# Patient Record
Sex: MALE | Race: WHITE | NOT HISPANIC OR LATINO | URBAN - METROPOLITAN AREA
[De-identification: names, ages, dates, MRNs, and addresses within clinical notes are randomized per-mention and may not be internally consistent; named-entity substitution may affect disease eponyms.]

---

## 2017-05-12 ENCOUNTER — OUTPATIENT (OUTPATIENT)
Dept: OUTPATIENT SERVICES | Facility: HOSPITAL | Age: 72
LOS: 1 days | Discharge: HOME | End: 2017-05-12

## 2017-05-19 DIAGNOSIS — D69.6 THROMBOCYTOPENIA, UNSPECIFIED: ICD-10-CM

## 2017-05-19 DIAGNOSIS — Z86.11 PERSONAL HISTORY OF TUBERCULOSIS: ICD-10-CM

## 2017-05-19 DIAGNOSIS — F31.78 BIPOLAR DISORDER, IN FULL REMISSION, MOST RECENT EPISODE MIXED: ICD-10-CM

## 2017-05-19 DIAGNOSIS — H35.372 PUCKERING OF MACULA, LEFT EYE: ICD-10-CM

## 2017-05-19 DIAGNOSIS — Z88.2 ALLERGY STATUS TO SULFONAMIDES: ICD-10-CM

## 2017-05-19 DIAGNOSIS — I83.90 ASYMPTOMATIC VARICOSE VEINS OF UNSPECIFIED LOWER EXTREMITY: ICD-10-CM

## 2017-05-19 DIAGNOSIS — E78.00 PURE HYPERCHOLESTEROLEMIA, UNSPECIFIED: ICD-10-CM

## 2017-05-19 DIAGNOSIS — F41.9 ANXIETY DISORDER, UNSPECIFIED: ICD-10-CM

## 2020-01-27 PROBLEM — Z00.00 ENCOUNTER FOR PREVENTIVE HEALTH EXAMINATION: Status: ACTIVE | Noted: 2020-01-27

## 2020-01-30 ENCOUNTER — APPOINTMENT (OUTPATIENT)
Dept: GERIATRICS | Facility: CLINIC | Age: 75
End: 2020-01-30
Payer: MEDICARE

## 2020-01-30 VITALS
BODY MASS INDEX: 24.42 KG/M2 | SYSTOLIC BLOOD PRESSURE: 118 MMHG | DIASTOLIC BLOOD PRESSURE: 60 MMHG | RESPIRATION RATE: 16 BRPM | WEIGHT: 155.6 LBS | HEART RATE: 62 BPM | HEIGHT: 67 IN | OXYGEN SATURATION: 99 % | TEMPERATURE: 98.2 F

## 2020-01-30 PROCEDURE — 99204 OFFICE O/P NEW MOD 45 MIN: CPT

## 2020-01-30 NOTE — ASSESSMENT
[Regular activities] : regular physical, social and mental activities [Social Work Referral] : Social Work referral [Daily physical exercise] : daily physical exercise [Medications discussed: _____] : Medications discussed: [unfilled] [Remove loose items] : remove loose rugs or any other loose items that could lead to tripping and falling [Follow up with Opthtamologist] : follow up regularly with an ophthalmologist to optimize vision [Dietary calcium intake] : dietary calcium intake discussed [Vitamin D supplementation] : Vitamin D supplementation [Daily physical exercise as tolerated] : Daily physical exercise as tolerated [Normal Weight (BMI <25)] : normal weight - BMI <25 [Driving] : driving [Vaccines] : vaccines [Medication Management] : medication management [Lab Results] : lab results [Discussed at today's visit] : Advance Directives Discussed at today's visit [FreeTextEntry1] : 76 yo M with PMHx of HTN, HLD, PVD with Left groin DVT, treated with blood thinners (pt. stated possibly Eliquis),s/p prophylactic right leg superficial vein removal surgery, bipolar disorder (treated with 51 sessions of ECT,last one 2004, mild memory loss residual), enlarged prostate with biopsy(benign) in 2019,last PSA 12/2019 (Dr. Kerns -Urology), prediabetes hx.\par \par 1. HTN \par    a. Stable, continue with Carvedilol 3.125 mg BID\par \par 2. CAD\par    a. Continue with Simvastatin 20 mg at bedtime.\par \par 3. Bipolar depression\par   a. Stable, continue with Fluvoxamine 100 mg at bedtime. \par \par 4. DVT -  completed treatment\par    a.Pt. educated to continue wearing compression stockings daily. \par \par 5. Health maintenance\par    a. Shringrix vaccine recommended, pt. educated on the efficacy of vaccine and spacing. \par    b. Advised to stop multivitamin, encouraged to maintain a healthy diet, eating 5-7 servings of fruits of vegetables      daily. \par   c. Follow up visit in 4 months. \par   d. Available for urgent visits or as needed by phone. \par \par 6. Advance care planning\par   a. Health care proxy discussed in today's visit, form provided to pt. and spouse. \par Jaye Toussaint, RN, CCRN\par FNP [facility]\par

## 2020-01-30 NOTE — SOCIAL HISTORY
[Fully functional (bathing, dressing, toileting, transferring, walking, feeding)] : Fully functional (bathing, dressing, toileting, transferring, walking, feeding) [No falls in past year] : Patient reported no falls in the past year [Fully functional (using the telephone, shopping, preparing meals, housekeeping, doing laundry, using transportation,] : Fully functional and needs no help or supervision to perform IADLs (using the telephone, shopping, preparing meals, housekeeping, doing laundry, using transportation, managing medications and managing finances) [Driving] : driving [FreeTextEntry1] : Ingrid Rodriguez [de-identified] : none

## 2020-01-30 NOTE — PHYSICAL EXAM
[General Appearance - Alert] : alert [General Appearance - In No Acute Distress] : in no acute distress [General Appearance - Well Nourished] : well nourished [General Appearance - Well Developed] : well developed [General Appearance - Well-Appearing] : healthy appearing [Sclera] : the sclera and conjunctiva were normal [PERRL With Normal Accommodation] : pupils were equal in size, round, and reactive to light [No Oral Pallor] : no oral pallor [No Oral Cyanosis] : no oral cyanosis [Normal Oral Mucosa] : normal oral mucosa [Outer Ear] : the ears and nose were normal in appearance [Hearing Threshold Finger Rub Not [place]] : hearing was normal [Neck Appearance] : the appearance of the neck was normal [Oropharynx] : The oropharynx was normal [Examination Of The Oral Cavity] : the lips and gums were normal [Jugular Venous Distention Increased] : there was no jugular-venous distention [Thyroid Diffuse Enlargement] : the thyroid was not enlarged [Neck Cervical Mass (___cm)] : no neck mass was observed [Exaggerated Use Of Accessory Muscles For Inspiration] : no accessory muscle use [Auscultation Breath Sounds / Voice Sounds] : lungs were clear to auscultation bilaterally [Respiration, Rhythm And Depth] : normal respiratory rhythm and effort [Chest Palpation] : palpation of the chest revealed no abnormalities [Apical Impulse] : the apical impulse was normal [Heart Sounds] : normal S1 and S2 [Heart Rate And Rhythm] : heart rate was normal and rhythm regular [Murmurs] : no murmurs [Heart Sounds Gallop] : no gallops [Edema] : there was no peripheral edema [Bowel Sounds] : normal bowel sounds [Abdomen Soft] : soft [Abdomen Tenderness] : non-tender [Abdomen Mass (___ Cm)] : no abdominal mass palpated [Cervical Lymph Nodes Enlarged Anterior Bilaterally] : anterior cervical [Supraclavicular Lymph Nodes Enlarged Bilaterally] : supraclavicular [Cervical Lymph Nodes Enlarged Posterior Bilaterally] : posterior cervical [No CVA Tenderness] : no ~M costovertebral angle tenderness [Axillary Lymph Nodes Enlarged Bilaterally] : axillary [Abnormal Walk] : normal gait [Skin Color & Pigmentation] : normal skin color and pigmentation [Musculoskeletal - Swelling] : no joint swelling seen [Skin Turgor] : normal skin turgor [] : no rash [Skin Lesions] : no skin lesions [Motor Exam] : the motor exam was normal [No Focal Deficits] : no focal deficits [Oriented To Time, Place, And Person] : oriented to person, place, and time [Impaired Insight] : insight and judgment were intact [Affect] : the affect was normal [Mood] : the mood was normal

## 2020-01-30 NOTE — REVIEW OF SYSTEMS
[Nocturia] : nocturia [Negative] : Endocrine [Chills] : no chills [Fever] : no fever [Feeling Poorly] : not feeling poorly [Feeling Tired] : not feeling tired [Recent Weight Gain (___ Lbs)] : no recent weight gain [Recent Weight Loss (___ Lbs)] : no recent weight loss [Eye Pain] : no eye pain [Red Eyes] : eyes not red [Dry Eyes] : no dryness of the eyes [Discharge From Eyes] : no purulent discharge from the eyes [Chest Pain] : no chest pain [Heart Rate Is Fast] : the heart rate was not fast [Palpitations] : no palpitations [Lower Ext Edema] : no extremity edema [Leg Claudication] : no intermittent leg claudication [Wheezing] : no wheezing [Shortness Of Breath] : no shortness of breath [SOB on Exertion] : no shortness of breath during exertion [Cough] : no cough [Constipation] : no constipation [Abdominal Pain] : no abdominal pain [Vomiting] : no vomiting [Diarrhea] : no diarrhea [Dysuria] : no dysuria [Incontinence] : no incontinence [Convulsions] : no convulsions [Confused] : no confusion [Dizziness] : no dizziness [Fainting] : no fainting [Suicidal] : not suicidal [Limb Weakness] : no limb weakness [Difficulty Walking] : no difficulty walking [Sleep Disturbances] : no sleep disturbances [Depression] : no depression [Anxiety] : no anxiety [FreeTextEntry3] : wear eye glasses. [FreeTextEntry8] : pt. stated he wakes up once during the night.

## 2020-01-30 NOTE — HISTORY OF PRESENT ILLNESS
[0] : 1) Little interest or pleasure doing things: Not at all [FreeTextEntry1] : 76 yo M with PMHx of HTN, HLD, PVD with Left groin DVT, treated with blood thinners (pt. stated possibly Eliquis),s/p prophylactic right leg superficial vein removal surgery, bipolar disorder (treated with 51 sessions of ECT,last one 2004, mild memory loss residual), enlarged prostate with biopsy(benign) in 2019,last PSA 12/2019 (Dr. Kerns -Urology),  prediabetes hx.\par \par Echo (5/2019): mild valvular disease\par Nuclear stress test (9/2019): rest EF 73%, exercise EF 66%. ECG stress positive for ischemia. Normal LV size and function,normal wall function.\par \par Pt. is a retired from the [employer]. He lives with his spouse Ingrid. Pt. drives, able to complete all ADLs and IADLs. Ingrid is partially blind. They participate in theater and volunteer activities. \par \par Today, pt. denies pain, denies chest pain,denies SOB, denies dizziness,denies malaise. Pt. reports eating a well balanced diet, he avoids added sugar. pt. reports normal daily bowel movements and, normal voiding daily. Pt. reports waking up once to void at night, he stated he is content with that. Pt. reports feeling well.  \par \par father with heart disease\par

## 2020-01-30 NOTE — CURRENT MEDS
[Reports Changes In Medication (including OTC)] : Patient reports changes in medication [Provider aware of all medications taken (including OTC)] : Patient stated provider is aware of all medications ~he/she~ is taking including OTC  [Medication Reconciliation Completed] : Medication reconciliation completed [ at pharmacy] :  at local pharmacy [] : does not miss any medication doses

## 2020-03-26 RX ORDER — PERPHENAZINE 2 MG
TABLET ORAL
Qty: 30 | Refills: 6 | Status: ACTIVE | COMMUNITY
Start: 2020-03-26

## 2020-03-26 RX ORDER — LUTEIN 6 MG
6 TABLET ORAL
Refills: 0 | Status: ACTIVE | COMMUNITY
Start: 2020-03-26

## 2020-03-26 RX ORDER — ASPIRIN ENTERIC COATED TABLETS 81 MG 81 MG/1
81 TABLET, DELAYED RELEASE ORAL
Qty: 30 | Refills: 6 | Status: ACTIVE | COMMUNITY
Start: 2020-03-26

## 2020-03-26 RX ORDER — CHOLECALCIFEROL (VITAMIN D3) 50 MCG
50 MCG TABLET ORAL
Refills: 0 | Status: ACTIVE | COMMUNITY
Start: 2020-03-26

## 2020-03-26 RX ORDER — GLUCOSAMINE HCL 500 MG
100 TABLET ORAL
Refills: 0 | Status: ACTIVE | COMMUNITY
Start: 2020-03-26

## 2020-03-26 RX ORDER — ELECTROLYTES/DEXTROSE
SOLUTION, ORAL ORAL DAILY
Refills: 0 | Status: ACTIVE | COMMUNITY
Start: 2020-03-26

## 2020-03-26 RX ORDER — FLUVOXAMINE MALEATE 100 MG/1
100 TABLET, FILM COATED ORAL
Refills: 0 | Status: ACTIVE | COMMUNITY
Start: 2020-03-26

## 2020-03-26 RX ORDER — PHENOL 1.4 %
250 AEROSOL, SPRAY (ML) MUCOUS MEMBRANE
Refills: 0 | Status: ACTIVE | COMMUNITY
Start: 2020-03-26

## 2020-05-13 ENCOUNTER — APPOINTMENT (OUTPATIENT)
Dept: GERIATRICS | Facility: CLINIC | Age: 75
End: 2020-05-13
Payer: MEDICARE

## 2020-05-13 PROCEDURE — 99213 OFFICE O/P EST LOW 20 MIN: CPT | Mod: 95

## 2020-05-13 NOTE — ASSESSMENT
[FreeTextEntry1] : 1. HTN \par  a. continue with Carvedilol 3.125 mg BID\par \par 2. CAD\par  a. Continue with Simvastatin 20 mg at bedtime.\par \par 3. Bipolar depression\par  a. Stable, continue with Fluvoxamine 100 mg 2 tabs at bedtime. \par \par recommended to incorporate daily physical activity \par plan to repeat bloodwork in fall/winter

## 2020-05-13 NOTE — HISTORY OF PRESENT ILLNESS
[Home] : at home, [unfilled] , at the time of the visit. [Medical Office: ([facility])___] : at the medical office located in  [Patient] : the patient [Self] : self [FreeTextEntry1] : 74 yo M with PMHx of HTN, HLD, PVD with Left groin DVT, completed tx with Eliquis),s/p prophylactic right leg superficial vein removal surgery, bipolar disorder (treated with 51 sessions of ECT,last one 2004, mild memory loss residual), enlarged prostate with biopsy(benign) in 2019,last PSA 12/2019 (Dr. Kerns -Urology), prediabetes hx.\par \par Echo (5/2019): mild valvular disease\par Nuclear stress test (9/2019): rest EF 73%, exercise EF 66%. ECG stress positive for ischemia. Normal LV size and function,normal wall function.\par \par Pt. is a retired from the [employer]. He lives with his spouse who is partially blind. Pt. drives, able to complete all ADLs and IADLs.   \par \par denies chest pain,SOB, /lightheadedness/dizziness, coughing or fever. He reports feeling well. \par notes loose BMs but denies diarrhea.  reports no longer taking metamucil.\par remains on same medications, states his mood is good.\par \par nocturia x2 during the night.  otherwise denies LUTS.\par

## 2020-05-13 NOTE — PHYSICAL EXAM
[General Appearance - Alert] : alert [General Appearance - In No Acute Distress] : in no acute distress [Sclera] : the sclera and conjunctiva were normal [Extraocular Movements] : extraocular movements were intact [] : no respiratory distress [Respiration, Rhythm And Depth] : normal respiratory rhythm and effort [Exaggerated Use Of Accessory Muscles For Inspiration] : no accessory muscle use [Skin Color & Pigmentation] : normal skin color and pigmentation [Affect] : the affect was normal [Mood] : the mood was normal

## 2020-09-25 ENCOUNTER — APPOINTMENT (OUTPATIENT)
Dept: GERIATRICS | Facility: CLINIC | Age: 75
End: 2020-09-25
Payer: MEDICARE

## 2020-09-25 VITALS
WEIGHT: 150.4 LBS | DIASTOLIC BLOOD PRESSURE: 62 MMHG | OXYGEN SATURATION: 97 % | HEART RATE: 69 BPM | HEIGHT: 67 IN | SYSTOLIC BLOOD PRESSURE: 110 MMHG | RESPIRATION RATE: 16 BRPM | TEMPERATURE: 98 F | BODY MASS INDEX: 23.61 KG/M2

## 2020-09-25 PROCEDURE — 99214 OFFICE O/P EST MOD 30 MIN: CPT

## 2020-09-25 NOTE — SOCIAL HISTORY
[Fully functional (bathing, dressing, toileting, transferring, walking, feeding)] : Fully functional (bathing, dressing, toileting, transferring, walking, feeding) [Fully functional (using the telephone, shopping, preparing meals, housekeeping, doing laundry, using transportation,] : Fully functional and needs no help or supervision to perform IADLs (using the telephone, shopping, preparing meals, housekeeping, doing laundry, using transportation, managing medications and managing finances)

## 2020-09-25 NOTE — HISTORY OF PRESENT ILLNESS
[FreeTextEntry1] : 76 yo M with PMHx of HTN, HLD, Carotid artery stenosis, mild valvular disease, PVD, hx of DVT, bipolar disorder, prediabetes, and bph seen for follow up visit.\par \par today complains of right hand contracture of 4th and 5th digits:  started ~15 years ago s/p injection.  denies pain or limitation in his daily activities.  \par \par Echo (5/2019): mild valvular disease\par Nuclear stress test (9/2019): rest EF 73%, exercise EF 66%.  Normal LV size and function,normal wall function.\par \par Pt. is a retired from the [employer]. He lives with his spouse who is partially blind. \par Pt. drives, able to complete all ADLs and IADLs. \par \par bph: nocturia 2x, follows with urology, but missed last appt due to COVID.\par \par

## 2020-09-25 NOTE — ASSESSMENT
[Designated Healthcare Proxy] : Designated healthcare proxy [Name: ___] : Health Care Proxy's Name: [unfilled]  [FreeTextEntry1] : f/u in 5 months

## 2020-09-25 NOTE — PHYSICAL EXAM
[General Appearance - Alert] : alert [General Appearance - In No Acute Distress] : in no acute distress [Sclera] : the sclera and conjunctiva were normal [Extraocular Movements] : extraocular movements were intact [Outer Ear] : the ears and nose were normal in appearance [Neck Appearance] : the appearance of the neck was normal [] : no respiratory distress [Respiration, Rhythm And Depth] : normal respiratory rhythm and effort [Exaggerated Use Of Accessory Muscles For Inspiration] : no accessory muscle use [Auscultation Breath Sounds / Voice Sounds] : lungs were clear to auscultation bilaterally [Apical Impulse] : the apical impulse was normal [Heart Rate And Rhythm] : heart rate was normal and rhythm regular [Heart Sounds] : normal S1 and S2 [Murmurs] : no murmurs [Edema] : there was no peripheral edema [Bowel Sounds] : normal bowel sounds [Abdomen Soft] : soft [Abdomen Tenderness] : non-tender [Nail Clubbing] : no clubbing  or cyanosis of the fingernails [Involuntary Movements] : no involuntary movements were seen [Skin Color & Pigmentation] : normal skin color and pigmentation [No Focal Deficits] : no focal deficits [Affect] : the affect was normal [Mood] : the mood was normal [FreeTextEntry1] : hearing aides

## 2020-09-29 ENCOUNTER — LABORATORY RESULT (OUTPATIENT)
Age: 75
End: 2020-09-29

## 2020-09-30 LAB
25(OH)D3 SERPL-MCNC: 47 NG/ML
ALBUMIN SERPL ELPH-MCNC: 4.5 G/DL
ALP BLD-CCNC: 61 U/L
ALT SERPL-CCNC: 21 U/L
ANION GAP SERPL CALC-SCNC: 12 MMOL/L
APPEARANCE: CLEAR
AST SERPL-CCNC: 23 U/L
BASOPHILS # BLD AUTO: 0.07 K/UL
BASOPHILS NFR BLD AUTO: 1.4 %
BILIRUB SERPL-MCNC: 0.7 MG/DL
BILIRUBIN URINE: NEGATIVE
BLOOD URINE: NORMAL
BUN SERPL-MCNC: 25 MG/DL
CALCIUM SERPL-MCNC: 9.5 MG/DL
CHLORIDE SERPL-SCNC: 101 MMOL/L
CHOLEST SERPL-MCNC: 136 MG/DL
CHOLEST/HDLC SERPL: 2.7 RATIO
CO2 SERPL-SCNC: 27 MMOL/L
COLOR: YELLOW
CREAT SERPL-MCNC: 1.19 MG/DL
EOSINOPHIL # BLD AUTO: 0.41 K/UL
EOSINOPHIL NFR BLD AUTO: 8.4 %
ESTIMATED AVERAGE GLUCOSE: 120 MG/DL
GLUCOSE QUALITATIVE U: NEGATIVE
GLUCOSE SERPL-MCNC: 103 MG/DL
HBA1C MFR BLD HPLC: 5.8 %
HCT VFR BLD CALC: 46.6 %
HDLC SERPL-MCNC: 49 MG/DL
HGB BLD-MCNC: 15.1 G/DL
IMM GRANULOCYTES NFR BLD AUTO: 0.2 %
KETONES URINE: NEGATIVE
LDLC SERPL CALC-MCNC: 71 MG/DL
LEUKOCYTE ESTERASE URINE: NEGATIVE
LYMPHOCYTES # BLD AUTO: 1.37 K/UL
LYMPHOCYTES NFR BLD AUTO: 28.1 %
MAN DIFF?: NORMAL
MCHC RBC-ENTMCNC: 32.4 GM/DL
MCHC RBC-ENTMCNC: 32.8 PG
MCV RBC AUTO: 101.1 FL
MONOCYTES # BLD AUTO: 0.43 K/UL
MONOCYTES NFR BLD AUTO: 8.8 %
NEUTROPHILS # BLD AUTO: 2.58 K/UL
NEUTROPHILS NFR BLD AUTO: 53.1 %
NITRITE URINE: NEGATIVE
PH URINE: 6
PLATELET # BLD AUTO: 126 K/UL
POTASSIUM SERPL-SCNC: 4.2 MMOL/L
PROT SERPL-MCNC: 6.5 G/DL
PROTEIN URINE: ABNORMAL
PSA SERPL-MCNC: 6.15 NG/ML
RBC # BLD: 4.61 M/UL
RBC # FLD: 12.2 %
SODIUM SERPL-SCNC: 141 MMOL/L
SPECIFIC GRAVITY URINE: 1.03
TRIGL SERPL-MCNC: 75 MG/DL
TSH SERPL-ACNC: 5.6 UIU/ML
UROBILINOGEN URINE: ABNORMAL
WBC # FLD AUTO: 4.87 K/UL

## 2020-12-22 ENCOUNTER — NON-APPOINTMENT (OUTPATIENT)
Age: 75
End: 2020-12-22

## 2020-12-28 ENCOUNTER — APPOINTMENT (OUTPATIENT)
Dept: GERIATRICS | Facility: CLINIC | Age: 75
End: 2020-12-28
Payer: MEDICARE

## 2020-12-28 VITALS
SYSTOLIC BLOOD PRESSURE: 118 MMHG | DIASTOLIC BLOOD PRESSURE: 70 MMHG | WEIGHT: 149.5 LBS | HEART RATE: 69 BPM | TEMPERATURE: 98 F | OXYGEN SATURATION: 98 % | HEIGHT: 67 IN | BODY MASS INDEX: 23.46 KG/M2 | RESPIRATION RATE: 16 BRPM

## 2020-12-28 PROCEDURE — 99214 OFFICE O/P EST MOD 30 MIN: CPT

## 2020-12-28 NOTE — PHYSICAL EXAM
[General Appearance - Alert] : alert [General Appearance - In No Acute Distress] : in no acute distress [Sclera] : the sclera and conjunctiva were normal [Extraocular Movements] : extraocular movements were intact [FreeTextEntry1] : hearing aides [Neck Appearance] : the appearance of the neck was normal [Jugular Venous Distention Increased] : there was no jugular-venous distention [] : no respiratory distress [Respiration, Rhythm And Depth] : normal respiratory rhythm and effort [Exaggerated Use Of Accessory Muscles For Inspiration] : no accessory muscle use [Auscultation Breath Sounds / Voice Sounds] : lungs were clear to auscultation bilaterally [Apical Impulse] : the apical impulse was normal [Heart Rate And Rhythm] : heart rate was normal and rhythm regular [Heart Sounds] : normal S1 and S2 [Heart Sounds Gallop] : no gallops [Murmurs] : no murmurs [Heart Sounds Pericardial Friction Rub] : no pericardial rub [Edema] : there was no peripheral edema [Abdomen Soft] : soft [Abdomen Tenderness] : non-tender [Abnormal Walk] : normal gait [Musculoskeletal - Swelling] : no joint swelling seen [Motor Tone] : muscle strength and tone were normal [Impaired Insight] : insight and judgment were intact [Affect] : the affect was normal [Mood] : the mood was normal

## 2020-12-28 NOTE — HISTORY OF PRESENT ILLNESS
[FreeTextEntry1] : 76 yo M with PMHx of HTN, HLD, Carotid artery stenosis, mild valvular disease, PVD, hx of DVT, bipolar disorder, prediabetes, and bph who presents for follow up after episode of fall last week: described as while walking into bedroom last Monday evening , fell onto the floor, without tripping, without dizziness or LOC.\par Ingrid helped him up.  Earlier that day they had a very active day.  lots of walking, shopping.\par Next morning he had some left ankle tenderness, but mild.  He went about his normal day activities.\par also notices: increase nasal congestion lately. but no dizziness\par denies any recent illness:no palpitations, sob, or chest pain.  no gi complaints, no respiratory complaints. denies weakness, speech changes or vision changes.  denies dizziness.\par no alcohol use.  only drinks 2 glasses a day.  Hx of feeling lightheaded with standing.  wears compression stockings daily and is careful upon standing.\par planning to see cardiology in spring (q6months).  Last carotid dopplers ~2017.\par \par denies any recurrence of episode. \par \par Saw Urology 11/2020 with labwork: \par PSA 6.1 stable from previous\par \par \par Echo (5/2019): mild valvular disease\par Nuclear stress test (9/2019): rest EF 73%, exercise EF 66%. Normal LV size and function,normal wall function.\par \par Pt. is a retired from the [employer]. He lives with his spouse who is partially blind. \par Pt. drives, able to complete all ADLs and IADLs. \par \par

## 2021-01-29 ENCOUNTER — TRANSCRIPTION ENCOUNTER (OUTPATIENT)
Age: 76
End: 2021-01-29

## 2021-03-22 ENCOUNTER — TRANSCRIPTION ENCOUNTER (OUTPATIENT)
Age: 76
End: 2021-03-22

## 2021-03-29 ENCOUNTER — APPOINTMENT (OUTPATIENT)
Dept: GERIATRICS | Facility: CLINIC | Age: 76
End: 2021-03-29
Payer: MEDICARE

## 2021-03-29 VITALS
WEIGHT: 147.8 LBS | SYSTOLIC BLOOD PRESSURE: 130 MMHG | OXYGEN SATURATION: 98 % | DIASTOLIC BLOOD PRESSURE: 60 MMHG | BODY MASS INDEX: 22.14 KG/M2 | RESPIRATION RATE: 18 BRPM | TEMPERATURE: 98.4 F | HEART RATE: 65 BPM | HEIGHT: 68.4 IN

## 2021-03-29 DIAGNOSIS — M24.541 CONTRACTURE, RIGHT HAND: ICD-10-CM

## 2021-03-29 PROCEDURE — 99214 OFFICE O/P EST MOD 30 MIN: CPT

## 2021-03-29 NOTE — REVIEW OF SYSTEMS
[Loss Of Hearing] : hearing loss [As Noted in HPI] : as noted in HPI [Negative] : Neurological [Fever] : no fever [Chest Pain] : no chest pain [Palpitations] : no palpitations [Cough] : no cough [Sleep Disturbances] : no sleep disturbances

## 2021-03-29 NOTE — HISTORY OF PRESENT ILLNESS
[FreeTextEntry1] : 75yo M with PMHx of HTN, HLD, Carotid artery stenosis, mild valvular disease, PVD, hx of DVT, bipolar disorder, prediabetes, and bph seen for follow up visit.\par \par episode of collapse in Dec.  has work up with cardiology that was essentially unrevealing.\par \par labwork from cardiology reviewed:\par Vit D, TSH, normal\par ldl 53 and tchol 132 \par Cr 0.96 with overall normal cmp\par normal cbc\par \par prediabetes: 5.9%  diet. \par \par cardiac work up with cards in jan 2021 essentially normal with echo, ekg, carotid dopplers, stress test, and holter monitor. 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  denies pain or limitation in his daily activities.  \par \par Echo (5/2019): mild valvular disease'  1/2021: mild valvular disease\par Nuclear stress test (9/2019): rest EF 73%, exercise EF 66%.  Normal LV size and function,normal wall function.\par Nuclear stress 1/2021: normal\par \par bph: nocturia 2x, follows with urology has appt next month\par +incomplete emptying.  denies reduced stream or dysuria, or hematuria.\par hx of hernia repair (left)\par occasionally with have right sided groin pain, but nothing consistent, and unclear to express when occurs.\par \par Pt. is a retired from the [employer]. He lives with his spouse who is partially blind. \par Pt. drives, able to complete all ADLs and IADLs.

## 2021-05-18 ENCOUNTER — TRANSCRIPTION ENCOUNTER (OUTPATIENT)
Age: 76
End: 2021-05-18

## 2021-05-19 ENCOUNTER — TRANSCRIPTION ENCOUNTER (OUTPATIENT)
Age: 76
End: 2021-05-19

## 2021-08-16 ENCOUNTER — APPOINTMENT (OUTPATIENT)
Dept: GERIATRICS | Facility: CLINIC | Age: 76
End: 2021-08-16

## 2021-08-20 ENCOUNTER — APPOINTMENT (OUTPATIENT)
Dept: GERIATRICS | Facility: CLINIC | Age: 76
End: 2021-08-20
Payer: MEDICARE

## 2021-08-20 VITALS
HEIGHT: 68 IN | HEART RATE: 65 BPM | TEMPERATURE: 97.6 F | BODY MASS INDEX: 21.69 KG/M2 | OXYGEN SATURATION: 97 % | RESPIRATION RATE: 16 BRPM | WEIGHT: 143.13 LBS | SYSTOLIC BLOOD PRESSURE: 100 MMHG | DIASTOLIC BLOOD PRESSURE: 52 MMHG

## 2021-08-20 DIAGNOSIS — Z13.820 ENCOUNTER FOR SCREENING FOR OSTEOPOROSIS: ICD-10-CM

## 2021-08-20 PROCEDURE — 99214 OFFICE O/P EST MOD 30 MIN: CPT

## 2021-08-20 NOTE — ASSESSMENT
[Designated Healthcare Proxy] : Designated healthcare proxy [Name: ___] : Health Care Proxy's Name: [unfilled]  [FreeTextEntry1] : f/u 6 months: f/u dexa at next week\par \par mild weight loss:  doesn't eat lunch: advised to increase meals and discuss protein intake\par

## 2021-08-20 NOTE — PHYSICAL EXAM
[General Appearance - Alert] : alert [General Appearance - In No Acute Distress] : in no acute distress [Extraocular Movements] : extraocular movements were intact [Sclera] : the sclera and conjunctiva were normal [Outer Ear] : the ears and nose were normal in appearance [Neck Appearance] : the appearance of the neck was normal [] : no respiratory distress [Respiration, Rhythm And Depth] : normal respiratory rhythm and effort [Exaggerated Use Of Accessory Muscles For Inspiration] : no accessory muscle use [Auscultation Breath Sounds / Voice Sounds] : lungs were clear to auscultation bilaterally [Apical Impulse] : the apical impulse was normal [Heart Rate And Rhythm] : heart rate was normal and rhythm regular [Heart Sounds] : normal S1 and S2 [Murmurs] : no murmurs [Edema] : there was no peripheral edema [Bowel Sounds] : normal bowel sounds [Abdomen Soft] : soft [Abdomen Tenderness] : non-tender [Nail Clubbing] : no clubbing  or cyanosis of the fingernails [Involuntary Movements] : no involuntary movements were seen [Skin Color & Pigmentation] : normal skin color and pigmentation [No Focal Deficits] : no focal deficits [Affect] : the affect was normal [Mood] : the mood was normal [FreeTextEntry1] : hearing aides

## 2021-08-20 NOTE — HISTORY OF PRESENT ILLNESS
[FreeTextEntry1] : 75yo M with PMHx of HTN, HLD, Carotid artery stenosis, mild valvular disease, PVD, hx of DVT, bipolar disorder, prediabetes, and bph seen for follow up visit.\par \par notes he is a side sleeper and will develop periodic pain on shoulder in middle of night, that does not inhibit his sleep and is self resolving.  denies radicular pain or associated neck pain.\par \par lower bp today, but asymptomatic\par \par had dental implants, had one implant that fell out, but otherwise without dental complaints\par \par also reports symptoms c/w hx of folliculitis of right cheek \par \par HTN: lower bp noted today but asymptomatic\par sinus arrhythmia with PACs on previous holter\par episode of collapse in Dec 2020.  has work up with cardiology that was essentially unrevealing.\par Echo (5/2019): mild valvular disease'  1/2021: mild valvular disease\par Nuclear stress test (9/2019): rest EF 73%, exercise EF 66%.  Normal LV size and function,normal wall function.\par Nuclear stress 1/2021: normal\par cardiac work up with cards in jan 2021 essentially normal with echo, ekg, carotid dopplers, stress test, and holter monitor. (sinus arrhythmia with PACs)\par denies chest pain sob, or palpitations\par \par Carotid artery stenosis:\par \par prediabetes: \par \par hx of varicose vein surgery\par DVT in left leg in past \par wears thigh high compression stockings since\par \par \par bph: nocturia 2x, follows with urology DR. Nimesh Kerns\par last PSA improved.  PSA 5/2021 4.83\par denies reduced stream or dysuria, or hematuria.\par hx of hernia repair (left)\par \par  hx of likely Dupuytren's contracture right hand; s/p injection >15 years ago. denies pain or limitation in his daily activities. \par \par scoliosis\par \par \par Pt. is a retired from the [employer]. He lives with his spouse who is partially blind. \par Pt. drives, able to complete all ADLs and IADLs.

## 2021-09-03 ENCOUNTER — LABORATORY RESULT (OUTPATIENT)
Age: 76
End: 2021-09-03

## 2021-09-08 ENCOUNTER — TRANSCRIPTION ENCOUNTER (OUTPATIENT)
Age: 76
End: 2021-09-08

## 2021-09-08 LAB
25(OH)D3 SERPL-MCNC: 51.8 NG/ML
ALBUMIN SERPL ELPH-MCNC: 4.4 G/DL
ALP BLD-CCNC: 61 U/L
ALT SERPL-CCNC: 19 U/L
ANION GAP SERPL CALC-SCNC: 12 MMOL/L
APPEARANCE: CLEAR
AST SERPL-CCNC: 20 U/L
BASOPHILS # BLD AUTO: 0.07 K/UL
BASOPHILS NFR BLD AUTO: 1.4 %
BILIRUB SERPL-MCNC: 0.5 MG/DL
BILIRUBIN URINE: NEGATIVE
BLOOD URINE: NEGATIVE
BUN SERPL-MCNC: 24 MG/DL
CALCIUM SERPL-MCNC: 9.1 MG/DL
CHLORIDE SERPL-SCNC: 102 MMOL/L
CHOLEST SERPL-MCNC: 134 MG/DL
CO2 SERPL-SCNC: 27 MMOL/L
COLOR: YELLOW
CREAT SERPL-MCNC: 1.06 MG/DL
EOSINOPHIL # BLD AUTO: 0.35 K/UL
EOSINOPHIL NFR BLD AUTO: 7.1 %
ESTIMATED AVERAGE GLUCOSE: 123 MG/DL
GLUCOSE QUALITATIVE U: NEGATIVE
GLUCOSE SERPL-MCNC: 112 MG/DL
HBA1C MFR BLD HPLC: 5.9 %
HCT VFR BLD CALC: 45.7 %
HDLC SERPL-MCNC: 57 MG/DL
HGB BLD-MCNC: 14.7 G/DL
IMM GRANULOCYTES NFR BLD AUTO: 0.2 %
KETONES URINE: NEGATIVE
LDLC SERPL CALC-MCNC: 67 MG/DL
LEUKOCYTE ESTERASE URINE: NEGATIVE
LYMPHOCYTES # BLD AUTO: 1.47 K/UL
LYMPHOCYTES NFR BLD AUTO: 29.9 %
MAN DIFF?: NORMAL
MCHC RBC-ENTMCNC: 32.2 GM/DL
MCHC RBC-ENTMCNC: 32.7 PG
MCV RBC AUTO: 101.6 FL
MONOCYTES # BLD AUTO: 0.45 K/UL
MONOCYTES NFR BLD AUTO: 9.2 %
NEUTROPHILS # BLD AUTO: 2.56 K/UL
NEUTROPHILS NFR BLD AUTO: 52.2 %
NITRITE URINE: NEGATIVE
NONHDLC SERPL-MCNC: 77 MG/DL
PH URINE: 6.5
PLATELET # BLD AUTO: 135 K/UL
POTASSIUM SERPL-SCNC: 4.6 MMOL/L
PROT SERPL-MCNC: 6.6 G/DL
PROTEIN URINE: NORMAL
RBC # BLD: 4.5 M/UL
RBC # FLD: 12.4 %
SODIUM SERPL-SCNC: 141 MMOL/L
SPECIFIC GRAVITY URINE: 1.02
TRIGL SERPL-MCNC: 50 MG/DL
TSH SERPL-ACNC: 4.82 UIU/ML
UROBILINOGEN URINE: ABNORMAL
WBC # FLD AUTO: 4.91 K/UL

## 2021-09-10 ENCOUNTER — TRANSCRIPTION ENCOUNTER (OUTPATIENT)
Age: 76
End: 2021-09-10

## 2021-09-16 ENCOUNTER — TRANSCRIPTION ENCOUNTER (OUTPATIENT)
Age: 76
End: 2021-09-16

## 2021-10-28 ENCOUNTER — TRANSCRIPTION ENCOUNTER (OUTPATIENT)
Age: 76
End: 2021-10-28

## 2021-10-29 ENCOUNTER — TRANSCRIPTION ENCOUNTER (OUTPATIENT)
Age: 76
End: 2021-10-29

## 2021-11-17 ENCOUNTER — NON-APPOINTMENT (OUTPATIENT)
Age: 76
End: 2021-11-17

## 2021-11-17 DIAGNOSIS — Z20.7 CONTACT WITH AND (SUSPECTED) EXPOSURE TO PEDICULOSIS, ACARIASIS AND OTHER INFESTATIONS: ICD-10-CM

## 2021-11-18 ENCOUNTER — NON-APPOINTMENT (OUTPATIENT)
Age: 76
End: 2021-11-18

## 2021-11-18 ENCOUNTER — APPOINTMENT (OUTPATIENT)
Dept: GERIATRICS | Facility: CLINIC | Age: 76
End: 2021-11-18
Payer: MEDICARE

## 2021-11-18 ENCOUNTER — OUTPATIENT (OUTPATIENT)
Dept: OUTPATIENT SERVICES | Facility: HOSPITAL | Age: 76
LOS: 1 days | End: 2021-11-18
Payer: MEDICARE

## 2021-11-18 ENCOUNTER — APPOINTMENT (OUTPATIENT)
Dept: RADIOLOGY | Facility: IMAGING CENTER | Age: 76
End: 2021-11-18
Payer: MEDICARE

## 2021-11-18 VITALS
HEART RATE: 85 BPM | SYSTOLIC BLOOD PRESSURE: 130 MMHG | TEMPERATURE: 98 F | RESPIRATION RATE: 16 BRPM | OXYGEN SATURATION: 95 % | DIASTOLIC BLOOD PRESSURE: 60 MMHG
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
## 2021-11-18 DIAGNOSIS — S59.901A UNSPECIFIED INJURY OF RIGHT ELBOW, INITIAL ENCOUNTER: ICD-10-CM

## 2021-11-18 DIAGNOSIS — R55 SYNCOPE AND COLLAPSE: ICD-10-CM

## 2021-11-18 DIAGNOSIS — S93.401A SPRAIN OF UNSPECIFIED LIGAMENT OF RIGHT ANKLE, INITIAL ENCOUNTER: ICD-10-CM

## 2021-11-18 PROCEDURE — 73080 X-RAY EXAM OF ELBOW: CPT | Mod: 26,RT

## 2021-11-18 PROCEDURE — 73080 X-RAY EXAM OF ELBOW: CPT

## 2021-11-18 PROCEDURE — 99215 OFFICE O/P EST HI 40 MIN: CPT | Mod: 25

## 2021-11-18 PROCEDURE — G2212 PROLONG OUTPT/OFFICE VIS: CPT

## 2021-11-18 PROCEDURE — 93000 ELECTROCARDIOGRAM COMPLETE: CPT

## 2021-11-18 NOTE — PHYSICAL EXAM
[Alert] : alert [No Acute Distress] : in no acute distress [Sclera] : the sclera and conjunctiva were normal [PERRL] : pupils were equal in size, round, and reactive to light [No Strabismus] : no strabismus was seen [Normal Oral Mucosa] : normal oral mucosa [No Oral Pallor] : no oral pallor [No Oral Cyanosis] : no oral cyanosis [Normal Appearance] : the appearance of the neck was normal [No Neck Mass] : no neck mass was observed [No Respiratory Distress] : no respiratory distress [No Acc Muscle Use] : no accessory muscle use [Respiration, Rhythm And Depth] : normal respiratory rhythm and effort [Auscultation Breath Sounds / Voice Sounds] : lungs were clear to auscultation bilaterally [Normal PMI] : the apical impulse was abnormal [Normal S1, S2] : normal S1 and S2 [Murmurs] : no murmurs [Heart Rate And Rhythm] : heart rate was normal and rhythm regular [Edema] : edema was not present [Bowel Sounds] : normal bowel sounds [Abdomen Tenderness] : non-tender [Abdomen Soft] : soft [Cervical Lymph Nodes Enlarged Posterior Bilaterally] : posterior cervical [Supraclavicular Lymph Nodes Enlarged Bilaterally] : supraclavicular [Cervical Lymph Nodes Enlarged Anterior Bilaterally] : anterior cervical, supraclavicular [Axillary Lymph Nodes Enlarged Bilaterally] : axillary [No CVA Tenderness] : no CVA  tenderness [No Focal Deficits] : no focal deficits [Normal Affect] : the affect was normal [Normal Mood] : the mood was normal [de-identified] : calm,responding appropriately. [de-identified] : minimal right upper sacral tenderness  [de-identified] : negative [place] test of ankle [de-identified] : two left sacral areas of ecchymosis 1 x 1 cm with minimal tenderness on palpation, able to bear weight and ambulate, no deformities noted

## 2021-11-18 NOTE — HISTORY OF PRESENT ILLNESS
[Any fall with injury in past year] : Patient reported fall with injury in the past year [FreeTextEntry1] : Mr. BRENDA KEENAN is a 75 yo M with PMHx of HTN, HLD, CAD, valvular disease, PVD, hx of DVT (not on ac), being seen for acute visit today for a fall. Spouse and patient prefer to be called"Gallo rosa" and Liss. \par \par -were here in office yesterday because Liss is on tx for scabies tx, pt. received prophylactically with Permethrin cream. \par -Took a very hot shower today, "more than I normally do".  \par -reports passing out,syncope\par -otherwise reports feeling well\par -had not eaten, or had water before fall. Never eats before showering though.\par -states "It was so hot in that stall"\par -reports right elbow tenderness with movement, right ankle tenderness \par -right buttocks tenderness/bruising \par -denies head injury\par \par \par \par reported BP today, /53 55HR\par \par Reports falling 1year ago, with no LOC. \par \par Denies pain. Denies acute visits. Denies HA/dizziness, SOB/CP, abdominal pain, dysuria, reports daily BMs regular. \par \par \par EKG today \par \par

## 2021-11-18 NOTE — REVIEW OF SYSTEMS
[Feeling Poorly] : not feeling poorly [Feeling Tired] : not feeling tired [Nosebleeds] : no nosebleeds [Sore Throat] : no sore throat [Chest Pain] : no chest pain [Palpitations] : no palpitations [Abdominal Pain] : no abdominal pain [Constipation] : no constipation [Anxiety] : no anxiety [Depression] : no depression

## 2021-11-18 NOTE — ASSESSMENT
[FreeTextEntry1] : -d/w Dr. Pathak and agrees.\par -available for urgent visits and as needed by phone. \par -return for evaluation in 2 weeks if no improvement\par -direct patient time 1:30-2:20pm\par \par  Jaye Toussaint, RADHAP-BC

## 2021-11-19 ENCOUNTER — NON-APPOINTMENT (OUTPATIENT)
Age: 76
End: 2021-11-19

## 2021-12-20 ENCOUNTER — APPOINTMENT (OUTPATIENT)
Dept: ORTHOPEDIC SURGERY | Facility: CLINIC | Age: 76
End: 2021-12-20
Payer: MEDICARE

## 2021-12-20 VITALS — RESPIRATION RATE: 16 BRPM | HEIGHT: 68 IN | BODY MASS INDEX: 21.67 KG/M2 | WEIGHT: 143 LBS

## 2021-12-20 DIAGNOSIS — Z86.59 PERSONAL HISTORY OF OTHER MENTAL AND BEHAVIORAL DISORDERS: ICD-10-CM

## 2021-12-20 DIAGNOSIS — Z86.79 PERSONAL HISTORY OF OTHER DISEASES OF THE CIRCULATORY SYSTEM: ICD-10-CM

## 2021-12-20 PROCEDURE — 99204 OFFICE O/P NEW MOD 45 MIN: CPT

## 2021-12-20 PROCEDURE — 73120 X-RAY EXAM OF HAND: CPT | Mod: 50

## 2022-01-04 ENCOUNTER — TRANSCRIPTION ENCOUNTER (OUTPATIENT)
Age: 77
End: 2022-01-04

## 2022-01-05 ENCOUNTER — TRANSCRIPTION ENCOUNTER (OUTPATIENT)
Age: 77
End: 2022-01-05

## 2022-02-16 ENCOUNTER — APPOINTMENT (OUTPATIENT)
Dept: GERIATRICS | Facility: CLINIC | Age: 77
End: 2022-02-16
Payer: MEDICARE

## 2022-02-16 VITALS
BODY MASS INDEX: 22.66 KG/M2 | SYSTOLIC BLOOD PRESSURE: 134 MMHG | RESPIRATION RATE: 15 BRPM | TEMPERATURE: 97.3 F | DIASTOLIC BLOOD PRESSURE: 78 MMHG | OXYGEN SATURATION: 99 % | HEART RATE: 55 BPM | WEIGHT: 149 LBS

## 2022-02-16 DIAGNOSIS — Z12.11 ENCOUNTER FOR SCREENING FOR MALIGNANT NEOPLASM OF COLON: ICD-10-CM

## 2022-02-16 PROCEDURE — 99214 OFFICE O/P EST MOD 30 MIN: CPT

## 2022-02-16 RX ORDER — PERMETHRIN 50 MG/G
5 CREAM TOPICAL
Qty: 1 | Refills: 0 | Status: DISCONTINUED | COMMUNITY
Start: 2021-11-17 | End: 2022-02-16

## 2022-02-16 NOTE — HISTORY OF PRESENT ILLNESS
[FreeTextEntry1] : 76yo M with PMHx of HTN, HLD, Carotid artery stenosis, mild valvular disease, PVD, hx of DVT, bipolar disorder, prediabetes, and bph seen for follow up visit.\par \par HTN: adherent with medications, no presyncopal episodes\par sinus arrhythmia with PACs on previous holter\par episode of collapse in Dec 2020.  has work up with cardiology that was essentially unrevealing.\par Echo (5/2019): mild valvular disease'  1/2021: mild valvular disease\par Nuclear stress test (9/2019): rest EF 73%, exercise EF 66%.  Normal LV size and function,normal wall function.\par Nuclear stress 1/2021: normal\par cardiac work up with cards in jan 2021 essentially normal with echo, ekg, carotid dopplers, stress test, and holter monitor. (sinus arrhythmia with PACs)\par denies chest pain sob, or palpitations\par \par Carotid artery stenosis: non obstructive\par \par prediabetes: \par \par hx of varicose vein surgery\par DVT in left leg in past \par wears thigh high compression stockings since\par \par \par bph: nocturia 2x, follows with urology DR. Nimesh Kerns\par last PSA improved.  PSA 5/2021 4.83\par PSA 12/2021 5.37\par denies reduced stream or dysuria, or hematuria.\par hx of hernia repair (left)\par \par  hx of likely Dupuytren's contracture right hand; s/p injection >15 years ago. denies pain or limitation in his daily activities. Saw Ortho, but opting for conservative management\par \par scoliosis\par \par had dental implants, had one implant that fell out, but otherwise without dental complaints\par hearing impaired wears hearing aide\par \par Pt. is a retired from the [employer]. He lives with his spouse who is partially blind. \par Pt. drives, able to complete all ADLs and IADLs.

## 2022-02-16 NOTE — ASSESSMENT
[Designated Healthcare Proxy] : Designated healthcare proxy [Name: ___] : Health Care Proxy's Name: [unfilled]  [FreeTextEntry1] : \par

## 2022-06-30 LAB
ALBUMIN SERPL ELPH-MCNC: 4.3 G/DL
ALP BLD-CCNC: 61 U/L
ALT SERPL-CCNC: 18 U/L
ANION GAP SERPL CALC-SCNC: 12 MMOL/L
AST SERPL-CCNC: 21 U/L
BASOPHILS # BLD AUTO: 0.04 K/UL
BASOPHILS NFR BLD AUTO: 0.9 %
BILIRUB SERPL-MCNC: 0.5 MG/DL
BUN SERPL-MCNC: 27 MG/DL
CALCIUM SERPL-MCNC: 9.1 MG/DL
CHLORIDE SERPL-SCNC: 104 MMOL/L
CHOLEST SERPL-MCNC: 130 MG/DL
CO2 SERPL-SCNC: 26 MMOL/L
CREAT SERPL-MCNC: 1.16 MG/DL
EGFR: 64 ML/MIN/1.73M2
EOSINOPHIL # BLD AUTO: 0.3 K/UL
EOSINOPHIL NFR BLD AUTO: 7 %
ESTIMATED AVERAGE GLUCOSE: 120 MG/DL
GLUCOSE SERPL-MCNC: 94 MG/DL
HBA1C MFR BLD HPLC: 5.8 %
HCT VFR BLD CALC: 43.1 %
HDLC SERPL-MCNC: 49 MG/DL
HGB BLD-MCNC: 14.5 G/DL
IMM GRANULOCYTES NFR BLD AUTO: 0.2 %
LDLC SERPL CALC-MCNC: 68 MG/DL
LYMPHOCYTES # BLD AUTO: 1.38 K/UL
LYMPHOCYTES NFR BLD AUTO: 32.3 %
MAN DIFF?: NORMAL
MCHC RBC-ENTMCNC: 33.2 PG
MCHC RBC-ENTMCNC: 33.6 GM/DL
MCV RBC AUTO: 98.6 FL
MONOCYTES # BLD AUTO: 0.42 K/UL
MONOCYTES NFR BLD AUTO: 9.8 %
NEUTROPHILS # BLD AUTO: 2.12 K/UL
NEUTROPHILS NFR BLD AUTO: 49.8 %
NONHDLC SERPL-MCNC: 81 MG/DL
PLATELET # BLD AUTO: 124 K/UL
POTASSIUM SERPL-SCNC: 4.9 MMOL/L
PROT SERPL-MCNC: 6.4 G/DL
RBC # BLD: 4.37 M/UL
RBC # FLD: 12.7 %
SODIUM SERPL-SCNC: 142 MMOL/L
TRIGL SERPL-MCNC: 65 MG/DL
TSH SERPL-ACNC: 4.04 UIU/ML
WBC # FLD AUTO: 4.27 K/UL

## 2022-07-01 ENCOUNTER — APPOINTMENT (OUTPATIENT)
Dept: GERIATRICS | Facility: CLINIC | Age: 77
End: 2022-07-01

## 2022-07-01 VITALS
WEIGHT: 145.5 LBS | SYSTOLIC BLOOD PRESSURE: 110 MMHG | HEIGHT: 68 IN | BODY MASS INDEX: 22.05 KG/M2 | TEMPERATURE: 97.6 F | DIASTOLIC BLOOD PRESSURE: 72 MMHG | HEART RATE: 68 BPM | RESPIRATION RATE: 16 BRPM | OXYGEN SATURATION: 97 %

## 2022-07-01 PROCEDURE — 99214 OFFICE O/P EST MOD 30 MIN: CPT

## 2022-07-01 NOTE — HISTORY OF PRESENT ILLNESS
[FreeTextEntry1] : 78yo M with PMHx of HTN, HLD, Carotid artery stenosis, mild valvular disease, PVD, hx of DVT, bipolar disorder, prediabetes, and bph seen for follow up visit.\par \par labwork reviewed with patient today\par \par HTN: adherent with medications, no presyncopal episodes\par sinus arrhythmia with PACs on previous holter\par episode of collapse in Dec 2020.  has work up with cardiology that was essentially unrevealing.\par Echo (5/2019): mild valvular disease'  1/2021: mild valvular disease\par Nuclear stress test (9/2019): rest EF 73%, exercise EF 66%.  Normal LV size and function,normal wall function.\par Nuclear stress 1/2021: normal\par cardiac work up with cards in jan 2021 essentially normal with echo, ekg, carotid dopplers, stress test, and holter monitor. (sinus arrhythmia with PACs)\par denies chest pain sob, or palpitations\par \par Carotid artery stenosis: non obstructive\par \par prediabetes: \par \par hx of varicose vein surgery\par DVT in left leg in past \par wears thigh high compression stockings since\par \par \par bph: nocturia 2x, follows with urology DR. Nimesh Kerns\par  PSA 6/2022 6.2\par PSA 5/2021 4.83\par PSA 12/2021 5.37\par denies dysuria\par hx of hernia repair (left)\par \par  hx of Dupuytren's contracture right hand; s/p injection >15 years ago. denies pain or limitation in his daily activities. Saw Ortho, but opting for conservative management\par \par scoliosis\par \par bipolar d/o: \par hx of ECT (last 2004)\par complains of sexual dysfunction: remains on fluvoxamine, follows with psych not wanting to change any medications as he has remained stable on current dose\par \par \par had dental implants, had one implant that fell out, but otherwise without dental complaints\par hearing impaired wears hearing aide\par \par Pt. is a retired from the [employer]. He lives with his spouse who is partially blind. \par Pt. drives, able to complete all ADLs and IADLs.

## 2023-01-18 ENCOUNTER — RX RENEWAL (OUTPATIENT)
Age: 78
End: 2023-01-18

## 2023-01-19 ENCOUNTER — RX RENEWAL (OUTPATIENT)
Age: 78
End: 2023-01-19

## 2023-01-23 ENCOUNTER — RX RENEWAL (OUTPATIENT)
Age: 78
End: 2023-01-23

## 2023-02-21 ENCOUNTER — TRANSCRIPTION ENCOUNTER (OUTPATIENT)
Age: 78
End: 2023-02-21

## 2023-02-28 ENCOUNTER — TRANSCRIPTION ENCOUNTER (OUTPATIENT)
Age: 78
End: 2023-02-28

## 2023-03-01 ENCOUNTER — TRANSCRIPTION ENCOUNTER (OUTPATIENT)
Age: 78
End: 2023-03-01

## 2023-03-03 LAB
ALBUMIN SERPL ELPH-MCNC: 4.2 G/DL
ALP BLD-CCNC: 57 U/L
ALT SERPL-CCNC: 17 U/L
ANION GAP SERPL CALC-SCNC: 9 MMOL/L
AST SERPL-CCNC: 18 U/L
BASOPHILS # BLD AUTO: 0.04 K/UL
BASOPHILS NFR BLD AUTO: 0.8 %
BILIRUB SERPL-MCNC: 0.7 MG/DL
BUN SERPL-MCNC: 26 MG/DL
CALCIUM SERPL-MCNC: 9.2 MG/DL
CHLORIDE SERPL-SCNC: 102 MMOL/L
CO2 SERPL-SCNC: 28 MMOL/L
CREAT SERPL-MCNC: 1.07 MG/DL
EGFR: 70 ML/MIN/1.73M2
EOSINOPHIL # BLD AUTO: 0.27 K/UL
EOSINOPHIL NFR BLD AUTO: 5.4 %
ESTIMATED AVERAGE GLUCOSE: 126 MG/DL
GLUCOSE SERPL-MCNC: 107 MG/DL
HBA1C MFR BLD HPLC: 6 %
HCT VFR BLD CALC: 43.4 %
HGB BLD-MCNC: 14.3 G/DL
IMM GRANULOCYTES NFR BLD AUTO: 0.2 %
LYMPHOCYTES # BLD AUTO: 1.23 K/UL
LYMPHOCYTES NFR BLD AUTO: 24.6 %
MAN DIFF?: NORMAL
MCHC RBC-ENTMCNC: 32.9 GM/DL
MCHC RBC-ENTMCNC: 33.5 PG
MCV RBC AUTO: 101.6 FL
MONOCYTES # BLD AUTO: 0.46 K/UL
MONOCYTES NFR BLD AUTO: 9.2 %
NEUTROPHILS # BLD AUTO: 3 K/UL
NEUTROPHILS NFR BLD AUTO: 59.8 %
PLATELET # BLD AUTO: 120 K/UL
POTASSIUM SERPL-SCNC: 4.9 MMOL/L
PROT SERPL-MCNC: 6.1 G/DL
RBC # BLD: 4.27 M/UL
RBC # FLD: 12.8 %
SODIUM SERPL-SCNC: 139 MMOL/L
WBC # FLD AUTO: 5.01 K/UL

## 2023-03-06 ENCOUNTER — APPOINTMENT (OUTPATIENT)
Dept: GERIATRICS | Facility: CLINIC | Age: 78
End: 2023-03-06
Payer: MEDICARE

## 2023-03-06 VITALS
TEMPERATURE: 97.7 F | OXYGEN SATURATION: 99 % | RESPIRATION RATE: 12 BRPM | BODY MASS INDEX: 21.52 KG/M2 | DIASTOLIC BLOOD PRESSURE: 60 MMHG | WEIGHT: 142 LBS | HEART RATE: 66 BPM | SYSTOLIC BLOOD PRESSURE: 118 MMHG | HEIGHT: 68 IN

## 2023-03-06 VITALS — DIASTOLIC BLOOD PRESSURE: 60 MMHG | SYSTOLIC BLOOD PRESSURE: 122 MMHG

## 2023-03-06 DIAGNOSIS — Z82.0 FAMILY HISTORY OF EPILEPSY AND OTHER DISEASES OF THE NERVOUS SYSTEM: ICD-10-CM

## 2023-03-06 DIAGNOSIS — Z80.1 FAMILY HISTORY OF MALIGNANT NEOPLASM OF TRACHEA, BRONCHUS AND LUNG: ICD-10-CM

## 2023-03-06 DIAGNOSIS — Z81.8 FAMILY HISTORY OF OTHER MENTAL AND BEHAVIORAL DISORDERS: ICD-10-CM

## 2023-03-06 DIAGNOSIS — Z80.3 FAMILY HISTORY OF MALIGNANT NEOPLASM OF BREAST: ICD-10-CM

## 2023-03-06 DIAGNOSIS — H91.90 UNSPECIFIED HEARING LOSS, UNSPECIFIED EAR: ICD-10-CM

## 2023-03-06 DIAGNOSIS — I65.21 OCCLUSION AND STENOSIS OF RIGHT CAROTID ARTERY: ICD-10-CM

## 2023-03-06 DIAGNOSIS — Z86.718 PERSONAL HISTORY OF OTHER VENOUS THROMBOSIS AND EMBOLISM: ICD-10-CM

## 2023-03-06 DIAGNOSIS — M72.0 PALMAR FASCIAL FIBROMATOSIS [DUPUYTREN]: ICD-10-CM

## 2023-03-06 DIAGNOSIS — Z82.49 FAMILY HISTORY OF ISCHEMIC HEART DISEASE AND OTHER DISEASES OF THE CIRCULATORY SYSTEM: ICD-10-CM

## 2023-03-06 DIAGNOSIS — Z83.3 FAMILY HISTORY OF DIABETES MELLITUS: ICD-10-CM

## 2023-03-06 DIAGNOSIS — N20.0 CALCULUS OF KIDNEY: ICD-10-CM

## 2023-03-06 DIAGNOSIS — Z83.1 FAMILY HISTORY OF OTHER INFECTIOUS AND PARASITIC DISEASES: ICD-10-CM

## 2023-03-06 DIAGNOSIS — Z80.9 FAMILY HISTORY OF MALIGNANT NEOPLASM, UNSPECIFIED: ICD-10-CM

## 2023-03-06 PROCEDURE — G0439: CPT

## 2023-03-06 RX ORDER — MAGNESIUM GLYCINATE 100 MG
665 CAPSULE ORAL
Refills: 0 | Status: DISCONTINUED | COMMUNITY
Start: 2022-02-16 | End: 2023-03-06

## 2023-03-06 NOTE — HISTORY OF PRESENT ILLNESS
[PMH Reviewed and Updated] : past medical history reviewed and updated [PSH Reviewed and Updated] : past surgical history reviewed and updated [Medication and Allergies Reconciled] : medication and allergies reconciled [Retired] : retired from work [Compliant with medications] : compliant with medications [Adequate] : adequate [Spouse] : spouse [Fully Independent] : fully independent [Drives without concerns] : drives without concerns [No history of falls] : no history of falls [Seatbelts] : seatbelts [Smoke Detectors] : smoke detectors [Carbon Monoxide Detector] : carbon monoxide detector [0] : 0 [Bathroom Grab Bars] : bathroom grab bars [Sunscreen] : sunscreen [Family History Reviewed and Updated] : family history reviewed and updated [de-identified] : no guns at home [FreeTextEntry1] : 77yo M with PMHx of HTN, HLD,  PVD, hx of DVT, bipolar disorder, prediabetes, and bph seen for annual visit\par \par \par 11/24/21 2/17/22 Shingrix\par \par labwork reviewed with patient today\par \par HTN: adherent with medications, no presyncopal episodes\par sinus arrhythmia with PACs on previous holter\par episode of collapse in Dec 2020. had work up with cardiology that was essentially unrevealing.\par Echo (5/2019): mild valvular disease' 1/2021: mild valvular disease\par Nuclear stress test (9/2019): rest EF 73%, exercise EF 66%. Normal LV size and function,normal wall function.\par Nuclear stress 1/2021: normal\par cardiac work up with cards in jan 2021 essentially normal with echo, ekg, carotid dopplers, stress test, and holter monitor. (sinus arrhythmia with PACs)\par denies chest pain sob, or palpitations\par \par Carotid artery stenosis: non obstructive 1/2021\par \par prediabetes: 6.0  has eating better\par now lifting weights\par \par hx of varicose vein surgery\par DVT in left leg in past \par wears thigh high compression stockings since\par \par \par bph: nocturia 2x, follows with urology DR. Nimesh Kerns\par PSA12/2022  mid 5\par  PSA 6/2022 6.2\par PSA 5/2021 4.83\par PSA 12/2021 5.37\par denies dysuria\par hx of hernia repair (left)\par \par  hx of Dupuytren's contracture- bilateral.\par right hand; s/p injection >15 years ago. denies pain or limitation in his daily activities. Saw Ortho, but opting for conservative management\par \par scoliosis\par \par bipolar d/o: controlled\par hx of ECT (last 2004)\par  remains on fluvoxamine, follows with psych \par \par goes for annual dermatology\par \par had dental implants, had one implant that fell out, but otherwise without dental complaints\par hearing impaired wears hearing aide\par \par Pt. is a retired from the [employer]. He lives with his spouse who is partially blind. \par Pt. drives, able to complete all ADLs and IADLs. \par

## 2023-03-06 NOTE — ASSESSMENT
[FreeTextEntry1] : check calcium score \par \par check labowrk prior to next visit:  they will call  -- include lipids

## 2023-03-06 NOTE — PHYSICAL EXAM
[Alert] : alert [Well Nourished] : well nourished [Healthy Appearing] : healthy appearing [No Acute Distress] : in no acute distress [Normal Voice/Communication] : normal voice/communication [Sclera] : the sclera and conjunctiva were normal [EOMI] : extraocular movements were intact [PERRL] : pupils were equal in size, round, and reactive to light [Normal Oral Mucosa] : normal oral mucosa [No Oral Pallor] : no oral pallor [No Oral Cyanosis] : no oral cyanosis [Normal Outer Ear/Nose] : the ears and nose were normal in appearance [Normal Lips/Gums] : the lips and gums were normal [Oropharynx] : the oropharynx was normal [Normal Appearance] : the appearance of the neck was normal [Supple] : the neck was supple [JVD] : there was jugular-venous distention [No Respiratory Distress] : no respiratory distress [No Acc Muscle Use] : no accessory muscle use [Respiration, Rhythm And Depth] : normal respiratory rhythm and effort [Auscultation Breath Sounds / Voice Sounds] : lungs were clear to auscultation bilaterally [Normal S1, S2] : normal S1 and S2 [Murmurs] : no murmurs [Heart Rate And Rhythm] : heart rate was normal and rhythm regular [Carotids Normal] : carotid pulses were normal with no bruits [Edema] : edema was not present [Bowel Sounds] : normal bowel sounds [Abdomen Tenderness] : non-tender [No Masses] : no abdominal mass palpated [Abdomen Soft] : soft [No Hernias] : no hernia was discovered [Cervical Lymph Nodes Enlarged Posterior Bilaterally] : posterior cervical [Supraclavicular Lymph Nodes Enlarged Bilaterally] : supraclavicular [Cervical Lymph Nodes Enlarged Anterior Bilaterally] : anterior cervical, supraclavicular [Axillary Lymph Nodes Enlarged Bilaterally] : axillary [Inguinal Lymph Nodes Enlarged Bilaterally] : inguinal [No Spinal Tenderness] : no spinal tenderness [Normal Gait] : normal gait [No Clubbing, Cyanosis] : no clubbing or cyanosis of the fingernails [Involuntary Movements] : no involuntary movements were seen [Motor Tone] : muscle strength and tone were normal [Normal Color / Pigmentation] : normal skin color and pigmentation [] : no rash [Skin Lesions] : no skin lesions [Sensation] : the sensory exam was normal to light touch and pinprick [No Focal Deficits] : no focal deficits [Normal Affect] : the affect was normal [Normal Mood] : the mood was normal

## 2023-03-06 NOTE — REVIEW OF SYSTEMS
[Eyesight Problems] : eyesight problems [Loss Of Hearing] : hearing loss [Lower Ext Edema] : lower extremity edema [Abdominal Pain] : no abdominal pain [Constipation] : no constipation [Incontinence] : no incontinence [Dizziness] : no dizziness [Fainting] : no fainting [Sleep Disturbances] : no sleep disturbances [Negative] : Endocrine [FreeTextEntry7] : frequent stools [FreeTextEntry8] : 1-2x nocturia

## 2023-03-08 ENCOUNTER — TRANSCRIPTION ENCOUNTER (OUTPATIENT)
Age: 78
End: 2023-03-08

## 2023-03-14 ENCOUNTER — APPOINTMENT (OUTPATIENT)
Dept: CT IMAGING | Facility: CLINIC | Age: 78
End: 2023-03-14
Payer: MEDICARE

## 2023-03-14 ENCOUNTER — OUTPATIENT (OUTPATIENT)
Dept: OUTPATIENT SERVICES | Facility: HOSPITAL | Age: 78
LOS: 1 days | End: 2023-03-14
Payer: MEDICARE

## 2023-03-14 DIAGNOSIS — I10 ESSENTIAL (PRIMARY) HYPERTENSION: ICD-10-CM

## 2023-03-14 DIAGNOSIS — R73.03 PREDIABETES: ICD-10-CM

## 2023-03-14 PROCEDURE — 75571 CT HRT W/O DYE W/CA TEST: CPT | Mod: 26

## 2023-03-14 PROCEDURE — 75571 CT HRT W/O DYE W/CA TEST: CPT

## 2023-03-20 ENCOUNTER — NON-APPOINTMENT (OUTPATIENT)
Age: 78
End: 2023-03-20

## 2023-03-21 ENCOUNTER — NON-APPOINTMENT (OUTPATIENT)
Age: 78
End: 2023-03-21

## 2023-03-22 ENCOUNTER — APPOINTMENT (OUTPATIENT)
Dept: GERIATRICS | Facility: CLINIC | Age: 78
End: 2023-03-22
Payer: MEDICARE

## 2023-03-22 ENCOUNTER — NON-APPOINTMENT (OUTPATIENT)
Age: 78
End: 2023-03-22

## 2023-03-22 ENCOUNTER — OUTPATIENT (OUTPATIENT)
Dept: OUTPATIENT SERVICES | Facility: HOSPITAL | Age: 78
LOS: 1 days | End: 2023-03-22
Payer: MEDICARE

## 2023-03-22 ENCOUNTER — APPOINTMENT (OUTPATIENT)
Dept: CT IMAGING | Facility: HOSPITAL | Age: 78
End: 2023-03-22
Payer: MEDICARE

## 2023-03-22 VITALS
SYSTOLIC BLOOD PRESSURE: 102 MMHG | HEART RATE: 67 BPM | HEIGHT: 68 IN | TEMPERATURE: 98.4 F | BODY MASS INDEX: 21.22 KG/M2 | WEIGHT: 140 LBS | DIASTOLIC BLOOD PRESSURE: 64 MMHG | OXYGEN SATURATION: 98 % | RESPIRATION RATE: 16 BRPM
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
## 2023-03-22 DIAGNOSIS — R05.9 COUGH, UNSPECIFIED: ICD-10-CM

## 2023-03-22 DIAGNOSIS — R55 SYNCOPE AND COLLAPSE: ICD-10-CM

## 2023-03-22 LAB
BASOPHILS # BLD AUTO: 0.03 K/UL
BASOPHILS NFR BLD AUTO: 0.4 %
EOSINOPHIL # BLD AUTO: 0.07 K/UL
EOSINOPHIL NFR BLD AUTO: 1 %
HCT VFR BLD CALC: 40.9 %
HGB BLD-MCNC: 13.4 G/DL
IMM GRANULOCYTES NFR BLD AUTO: 0.7 %
LYMPHOCYTES # BLD AUTO: 1.54 K/UL
LYMPHOCYTES NFR BLD AUTO: 21.5 %
MAN DIFF?: NORMAL
MCHC RBC-ENTMCNC: 32.4 PG
MCHC RBC-ENTMCNC: 32.8 GM/DL
MCV RBC AUTO: 99 FL
MONOCYTES # BLD AUTO: 0.7 K/UL
MONOCYTES NFR BLD AUTO: 9.8 %
NEUTROPHILS # BLD AUTO: 4.76 K/UL
NEUTROPHILS NFR BLD AUTO: 66.6 %
PLATELET # BLD AUTO: 148 K/UL
RBC # BLD: 4.13 M/UL
RBC # FLD: 12.6 %
WBC # FLD AUTO: 7.15 K/UL

## 2023-03-22 PROCEDURE — 70450 CT HEAD/BRAIN W/O DYE: CPT | Mod: 26,MH

## 2023-03-22 PROCEDURE — 70450 CT HEAD/BRAIN W/O DYE: CPT

## 2023-03-22 PROCEDURE — 99215 OFFICE O/P EST HI 40 MIN: CPT

## 2023-03-22 NOTE — ASSESSMENT
[FreeTextEntry1] : fall at home:\par possible vasovagal episode in setting of dehydration and suspected viral upper respiratory infection.\par saw ortho yesterday with negative imaging of neck/back and left foot\par pain is improving, advised may use voltaren gel prn and tylenol prn \par low suspicion for intracranial bleed, discussed r/b/a and he agrees to have CTH -  he will have done today at 1pm\par lower suspicion for cardiac etiology,  cardiac work up with cards in jan 2021 essentially normal with echo, ekg, carotid dopplers, stress test, and holter monitor. (sinus arrhythmia with PACs), however advised for him to follow up with cardiology for evaluation.  states he needs referral to new cardiologist.\par \par \par hypotension:\par advised to increase hydration\par hold carvedilol and continue with home BP monitoring\par check labwork today\par \par cough: mild\par suspect viral infection, improving course\par flu/covid swab today\par advised to avoid OTC cough medication for drug-drug interactions with fluvoxamine\par lungs clear on exam today, o2 sat 98%\par \par

## 2023-03-22 NOTE — REVIEW OF SYSTEMS
[Fever] : no fever [Eyesight Problems] : eyesight problems [Loss Of Hearing] : hearing loss [As noted in HPI] : as noted in HPI [Abdominal Pain] : no abdominal pain [Diarrhea] : no diarrhea [As Noted in HPI] : as noted in HPI [Negative] : Endocrine

## 2023-03-22 NOTE — PHYSICAL EXAM
[Alert] : alert [No Acute Distress] : in no acute distress [Normal Voice/Communication] : normal voice/communication [Sclera] : the sclera and conjunctiva were normal [EOMI] : extraocular movements were intact [Normal Outer Ear/Nose] : the ears and nose were normal in appearance [Normal Appearance] : the appearance of the neck was normal [Supple] : the neck was supple [No Respiratory Distress] : no respiratory distress [No Acc Muscle Use] : no accessory muscle use [Respiration, Rhythm And Depth] : normal respiratory rhythm and effort [Auscultation Breath Sounds / Voice Sounds] : lungs were clear to auscultation bilaterally [Normal S1, S2] : normal S1 and S2 [Heart Rate And Rhythm] : heart rate was normal and rhythm regular [Edema] : edema was not present [Abdomen Tenderness] : non-tender [Abdomen Soft] : soft [Cervical Lymph Nodes Enlarged Posterior Bilaterally] : posterior cervical [Supraclavicular Lymph Nodes Enlarged Bilaterally] : supraclavicular [Cervical Lymph Nodes Enlarged Anterior Bilaterally] : anterior cervical, supraclavicular [No Spinal Tenderness] : no spinal tenderness [Normal Gait] : normal gait [No Clubbing, Cyanosis] : no clubbing or cyanosis of the fingernails [Involuntary Movements] : no involuntary movements were seen [Motor Tone] : muscle strength and tone were normal [Normal Color / Pigmentation] : normal skin color and pigmentation [No Focal Deficits] : no focal deficits [Motor Exam] : the motor exam was normal [Normal Affect] : the affect was normal [Normal Mood] : the mood was normal [de-identified] : mild bruising and swelling over left foot at base of 2-4 toes [de-identified] : normal sensation to light touch

## 2023-03-22 NOTE — HISTORY OF PRESENT ILLNESS
[FreeTextEntry1] : 78yoM seen for follow up aftr a fall at home on saturday in middle of night while walking to the bathroom to urinate. wife heard the fall and found patient on the bathroom floor.  He was confused and unable to get to up initially, and EMS was call.  He then was able to get up and walk into the living room and waited for EMS.  EMS evaluted him and had bp 100/60, HR 65 and o2 sat 98%, EKG showing NSR and repeat bp was 112/58. ems recommended ER evaluation, but he declined.\par he reports prior to fall on saturday, since thrusday he has had mucus/phlegm with cough.  was taking [facility] brand cold medication and sinus spray and mucinex.\par post fall he was having left foot bruising and pain and back tenderness,  went to Orthopedics on monay with xrays that were negative for any fractures.  he continues with coughing with a lot of mucus.\par on monday our office spoke with patient and recommended he seek evaluation in ER, but he did not go.  On tuesday our office and myself again spoke with patient who stated his pain was improving.\par \par he comes in today reporting that he is feeling better.  his back pain and left foot pain has improved.\par he continues with mucus, but his cough is a little less today\par no fever and o2 sat normal\par \par the fall was not witnessed, but he denies any prodromal symptoms\par he denies chest pain, palpitations or shortness of breath.\par he doesn't know if he hit his head.  His wife states he was disoriented/confused briefly when she found him on the floor.\par he denies any headache, vision changes, or neurological changes.\par \par he eating has been reduced-  only ate oatmeal last night, toast today\par hasn't been drinking much water like he normally does.\par

## 2023-03-23 ENCOUNTER — NON-APPOINTMENT (OUTPATIENT)
Age: 78
End: 2023-03-23

## 2023-03-23 LAB
ALBUMIN SERPL ELPH-MCNC: 3.8 G/DL
ALP BLD-CCNC: 62 U/L
ALT SERPL-CCNC: 19 U/L
ANION GAP SERPL CALC-SCNC: 13 MMOL/L
AST SERPL-CCNC: 23 U/L
BILIRUB SERPL-MCNC: 0.6 MG/DL
BUN SERPL-MCNC: 28 MG/DL
CALCIUM SERPL-MCNC: 9.1 MG/DL
CHLORIDE SERPL-SCNC: 100 MMOL/L
CO2 SERPL-SCNC: 27 MMOL/L
CREAT SERPL-MCNC: 1.02 MG/DL
EGFR: 74 ML/MIN/1.73M2
GLUCOSE SERPL-MCNC: 90 MG/DL
INFLUENZA A RESULT: NOT DETECTED
INFLUENZA B RESULT: NOT DETECTED
POTASSIUM SERPL-SCNC: 4.7 MMOL/L
PROT SERPL-MCNC: 6.2 G/DL
RESP SYN VIRUS RESULT: NOT DETECTED
SARS-COV-2 RESULT: NOT DETECTED
SODIUM SERPL-SCNC: 140 MMOL/L

## 2023-03-24 ENCOUNTER — NON-APPOINTMENT (OUTPATIENT)
Age: 78
End: 2023-03-24

## 2023-03-24 ENCOUNTER — APPOINTMENT (OUTPATIENT)
Dept: CARDIOLOGY | Facility: CLINIC | Age: 78
End: 2023-03-24
Payer: MEDICARE

## 2023-03-24 VITALS
SYSTOLIC BLOOD PRESSURE: 130 MMHG | BODY MASS INDEX: 21.22 KG/M2 | WEIGHT: 140 LBS | HEIGHT: 68 IN | HEART RATE: 83 BPM | DIASTOLIC BLOOD PRESSURE: 62 MMHG | TEMPERATURE: 98 F | OXYGEN SATURATION: 98 %

## 2023-03-24 DIAGNOSIS — I77.9 DISORDER OF ARTERIES AND ARTERIOLES, UNSPECIFIED: ICD-10-CM

## 2023-03-24 DIAGNOSIS — R93.1 ABNORMAL FINDINGS ON DIAGNOSTIC IMAGING OF HEART AND CORONARY CIRCULATION: ICD-10-CM

## 2023-03-24 DIAGNOSIS — R55 SYNCOPE AND COLLAPSE: ICD-10-CM

## 2023-03-24 DIAGNOSIS — E86.0 DEHYDRATION: ICD-10-CM

## 2023-03-24 PROCEDURE — 99204 OFFICE O/P NEW MOD 45 MIN: CPT

## 2023-03-24 PROCEDURE — 93000 ELECTROCARDIOGRAM COMPLETE: CPT

## 2023-03-26 PROBLEM — R55 NEAR SYNCOPE: Status: ACTIVE | Noted: 2023-03-26

## 2023-03-26 PROBLEM — R93.1 ELEVATED CORONARY ARTERY CALCIUM SCORE: Status: ACTIVE | Noted: 2023-03-26

## 2023-03-26 PROBLEM — E86.0 DEHYDRATION: Status: ACTIVE | Noted: 2023-03-22

## 2023-03-26 PROBLEM — I77.9 CAROTID ARTERY DISEASE: Status: ACTIVE | Noted: 2023-03-24

## 2023-03-26 NOTE — CARDIOLOGY SUMMARY
[de-identified] : ECG (3/24/23): normal sinus rhythm  [de-identified] : Nuclear stress test (2008) at outside facility: Normal [de-identified] : TTE (2021) at outside facility: LVEF 65%. Mild MR. Mild TR  [de-identified] : CT Heart Calcium Score (3/2023): 1961  [de-identified] : Carotid US (2021) at outside facility: Normal

## 2023-03-26 NOTE — REVIEW OF SYSTEMS
[Cough] : cough [Dizziness] : dizziness [Headache] : no headache [Blurry Vision] : no blurred vision [Chest Discomfort] : no chest discomfort [SOB] : no shortness of breath [Lower Ext Edema] : no extremity edema [Palpitations] : no palpitations [Abdominal Pain] : no abdominal pain [Joint Pain] : no joint pain [Rash] : no rash [Confusion] : no confusion was observed [Easy Bruising] : no tendency for easy bruising

## 2023-03-26 NOTE — HISTORY OF PRESENT ILLNESS
[FreeTextEntry1] : Drake Martin is an 78 year old man with past medical history of Major Depressive disorder (history of electroconvulsive therapy in 2003), Hypertension, Hyperlipidemia and history of Left leg DVT (s/p warfarin) presents for consultation regarding near syncope.\par \par The patient reports that he has had viral URI symptoms for the past week, recurrent cough and has been taking over the counter cough suppressants. He recalls this past Saturday, waking up in the middle of the night to urinate and then cannot recall what happened afterwards, believed he passed out, his wife was present and reports that he was incoherent and confused. EMS was called and he reports his BP was 100/60 and he refused transport to the ER. He reports that normally his BP is 120-130s. Denies tongue biting or fecal or urinary incontinence. Wife denies noticing any seizure like activity and reports that he did not lose consciousness. He reports that he had an episode of syncope in the past in the setting of taking a hot shower and also when he was ambulating in his bedroom. Previously followed cardiologist, Dr. Baljinder Guadalupe, denies history of myocardial infarction. In general denies exertional angina or dyspnea, denies palpitations.

## 2023-03-26 NOTE — ASSESSMENT
[FreeTextEntry1] : Assessment:\par Drake Martin is an 78 year old man with past medical history of Major Depressive disorder (history of electroconvulsive therapy in 2003), Hypertension, Hyperlipidemia and history of Left leg DVT (s/p warfarin) presents for consultation regarding near syncope.\par \par The patient reports that he has had viral URI symptoms for the past week, recurrent cough and has been taking over the counter cough suppressants. He recalls this past Saturday, waking up in the middle of the night to urinate and then cannot recall what happened afterwards, believed he passed out, his wife was present and reports that he was incoherent and confused. No signs of seizure like activity and no clear syncope. Labs unremarkable. His BP appears to have been low at that time, suggestive of vasovagal episode in setting of dehydration/viral illness, however due to risk factors and significantly elevated coronary artery calcium score recommend further evaluation for ischemic and structural heart disease.\par \par Recommendations:\par [] Near syncope: Will check echocardiogram to evaluate for structural heart disease, re-evaluate mitral and tricuspid regurgitation (reported on outside facility echo in 2021), evaluate for valvular stenosis. Recommended patient to increase water intake and hold off on taking Carvedilol at this time unless BP > 130/80. Check carotid US. Would also benefit from cardiac event monitor.\par [] Hypertension: BP stable, recommended patient to monitor BP daily, if BP > 130/80 he will resume Carvedilol but given recent hypotension prefer to hold off on restarting at this time.\par [] Elevated coronary artery calcium score: 1961 is high. Will check coronary CTA to evaluate degree of coronary stenosis to ensure no underlying obstructive CAD. Recommended patient to continue Aspirin 81 mg daily & statin (would benefit from higher intensity statin)\par [] Return to office: 3-4 weeks

## 2023-03-26 NOTE — PHYSICAL EXAM
[Normal Conjunctiva] : normal conjunctiva [No Edema] : no edema [Normal] : moves all extremities, no focal deficits, normal speech [de-identified] : elderly man, coughing [de-identified] : supple, no carotid bruits b/l [de-identified] : JVP ~ 7 cm H20, RRR, s1, s2 [de-identified] : unlabored respirations, clear lung fields [de-identified] : non-distended

## 2023-03-28 ENCOUNTER — TRANSCRIPTION ENCOUNTER (OUTPATIENT)
Age: 78
End: 2023-03-28

## 2023-04-04 ENCOUNTER — APPOINTMENT (OUTPATIENT)
Dept: CARDIOLOGY | Facility: CLINIC | Age: 78
End: 2023-04-04
Payer: MEDICARE

## 2023-04-04 PROCEDURE — 93880 EXTRACRANIAL BILAT STUDY: CPT

## 2023-04-07 ENCOUNTER — APPOINTMENT (OUTPATIENT)
Dept: CARDIOLOGY | Facility: CLINIC | Age: 78
End: 2023-04-07
Payer: MEDICARE

## 2023-04-07 PROCEDURE — 93306 TTE W/DOPPLER COMPLETE: CPT

## 2023-04-12 ENCOUNTER — NON-APPOINTMENT (OUTPATIENT)
Age: 78
End: 2023-04-12

## 2023-04-13 ENCOUNTER — RESULT REVIEW (OUTPATIENT)
Age: 78
End: 2023-04-13

## 2023-04-13 ENCOUNTER — APPOINTMENT (OUTPATIENT)
Dept: CT IMAGING | Facility: CLINIC | Age: 78
End: 2023-04-13
Payer: MEDICARE

## 2023-04-13 PROCEDURE — 75574 CT ANGIO HRT W/3D IMAGE: CPT | Mod: MH

## 2023-04-16 ENCOUNTER — RESULT REVIEW (OUTPATIENT)
Age: 78
End: 2023-04-16

## 2023-04-17 PROCEDURE — 0504T: CPT | Mod: MH

## 2023-04-17 PROCEDURE — 0501T: CPT | Mod: MH

## 2023-04-20 ENCOUNTER — TRANSCRIPTION ENCOUNTER (OUTPATIENT)
Age: 78
End: 2023-04-20

## 2023-04-20 RX ORDER — SIMVASTATIN 20 MG/1
20 TABLET, FILM COATED ORAL
Qty: 90 | Refills: 3 | Status: DISCONTINUED | COMMUNITY
Start: 2020-03-26 | End: 2023-04-20

## 2023-04-24 ENCOUNTER — TRANSCRIPTION ENCOUNTER (OUTPATIENT)
Age: 78
End: 2023-04-24

## 2023-04-27 ENCOUNTER — NON-APPOINTMENT (OUTPATIENT)
Age: 78
End: 2023-04-27

## 2023-05-01 ENCOUNTER — NON-APPOINTMENT (OUTPATIENT)
Age: 78
End: 2023-05-01

## 2023-05-04 ENCOUNTER — RESULT CHARGE (OUTPATIENT)
Age: 78
End: 2023-05-04

## 2023-05-04 ENCOUNTER — APPOINTMENT (OUTPATIENT)
Dept: CARDIOLOGY | Facility: CLINIC | Age: 78
End: 2023-05-04
Payer: MEDICARE

## 2023-05-04 VITALS
TEMPERATURE: 95.3 F | RESPIRATION RATE: 20 BRPM | HEART RATE: 75 BPM | HEIGHT: 68 IN | SYSTOLIC BLOOD PRESSURE: 170 MMHG | WEIGHT: 140 LBS | DIASTOLIC BLOOD PRESSURE: 75 MMHG | OXYGEN SATURATION: 98 % | BODY MASS INDEX: 21.22 KG/M2

## 2023-05-04 PROCEDURE — 99214 OFFICE O/P EST MOD 30 MIN: CPT

## 2023-05-04 PROCEDURE — 93000 ELECTROCARDIOGRAM COMPLETE: CPT

## 2023-05-05 ENCOUNTER — NON-APPOINTMENT (OUTPATIENT)
Age: 78
End: 2023-05-05

## 2023-05-05 NOTE — HISTORY OF PRESENT ILLNESS
[FreeTextEntry1] : Drake Martin is an 78 year old man with past medical history of Major Depressive disorder (history of electroconvulsive therapy in 2003), Hypertension, Hyperlipidemia and history of Left leg DVT (s/p warfarin) with recently diagnosed Multivessel CAD presents for follow-up visit.\par \par The patient is present with his friend, Ingrid. Since his last visit the patient underwent cardiac testing including CCTA that was abnormal and consistent with likely obstructive multivessel CAD. The patient was referred for coronary angiogram but declined, he states that he is not symptomatic and so prefers to avoid coronary angiogram at this time. He reports that he is not very active, but walks around and does not experience exertional chest pain or shortness of breath. Reports compliance with medications.

## 2023-05-05 NOTE — ADDENDUM
[FreeTextEntry1] : EKG ordered and obtained due to coronary artery disease, to ensure no ischemic ST abnormalities.

## 2023-05-05 NOTE — PHYSICAL EXAM
[Normal Conjunctiva] : normal conjunctiva [No Edema] : no edema [Normal] : moves all extremities, no focal deficits, normal speech [de-identified] : no acute distress [de-identified] : supple [de-identified] : JVP ~ 7 cm H20, RRR, s1, s2 [de-identified] : unlabored respirations, clear lung fields [de-identified] : non-distended

## 2023-05-05 NOTE — ASSESSMENT
[FreeTextEntry1] : Assessment:\par Drake Martin is an 78 year old man with past medical history of Major Depressive disorder (history of electroconvulsive therapy in 2003), Hypertension, Hyperlipidemia and history of Left leg DVT (s/p warfarin) with recently diagnosed Multivessel CAD presents for follow-up visit.\par \par The patient is present with his friend, Ingrid. Since his last visit the patient underwent cardiac testing including CCTA that was abnormal and consistent with significantly elevated calcium score 1791, left main < 25% stenosis and high probability for lesion specific ischemia in OM3, PDA and distal LAD. Echocardiogram (4/2023) consistent with normal LVEF 55-60%, mild LVH, normal RV size and systolic function, no significant valvular disease. The patient is not very active, but denies exertional angina or dyspnea and is unsure if he would like to proceed with coronary angiogram and PCI.\par \par Recommendations:\par [] Multivessel CAD: Will plan for exercise treadmill stress test to evaluate if patient develops symptoms that would be a guideline indication for coronary angiogram and PCI. Patient to continue Aspirin 81 mg daily, Atorvastatin 40 mg daily, Carvedilol 3.125 mg BID (will hold day prior to stress test). Patient recommended to go to ER if any new chest pain/dyspnea symptoms. Explained to patient that he is at high risk for myocardial infarction with unrevascularized ischemic heart disease. \par [] Hypertension: BP elevated today, improved on repeat, goal BP < 130/80, continue Carvedilol. \par [] Return to office: 1 week

## 2023-05-05 NOTE — REVIEW OF SYSTEMS
[Headache] : no headache [Blurry Vision] : no blurred vision [SOB] : no shortness of breath [Chest Discomfort] : no chest discomfort [Lower Ext Edema] : no extremity edema [Palpitations] : no palpitations [Cough] : no cough [Abdominal Pain] : no abdominal pain [Joint Pain] : no joint pain [Rash] : no rash [Dizziness] : no dizziness [Confusion] : no confusion was observed [Easy Bruising] : no tendency for easy bruising

## 2023-05-05 NOTE — CARDIOLOGY SUMMARY
[de-identified] : ECG (3/24/23): normal sinus rhythm \par ECG (5/4/23): normal sinus rhythm  [de-identified] : Nuclear stress test (2008) at outside facility: Normal [de-identified] : TTE (2021) at outside facility: LVEF 65%. Mild MR. Mild TR \par \par TTE (4/2023): LVEF 55-60%, mild LVH. Normal RV size and systolic function. Mild MR. Mild TR. No aortic valve stenosis. Mild AR. No pericardial effusion. [de-identified] : CT Heart Calcium Score (3/2023): 1961 \par \par CT Coronary Angiogram (4/2023): Calcium score 1791. LM < 25% stenosis. LAD moderate luminal narrowing. Diagonals normal. LCx proximal moderate luminal narrowing. OM3 significant severe stenosis secondary to calcified and noncalcified plaque. RCA mild luminal narrowing. \par \par CT FFR (4/2023): High probability for lesion specific ischemia in OM3, PDA and distal LAD\par  [de-identified] : Carotid US (4/2023): Right proximal ICA 1-19% stenosis. Left proximal ICA 1-19% stenosis.

## 2023-05-08 ENCOUNTER — RX RENEWAL (OUTPATIENT)
Age: 78
End: 2023-05-08

## 2023-05-09 ENCOUNTER — RX RENEWAL (OUTPATIENT)
Age: 78
End: 2023-05-09

## 2023-05-12 ENCOUNTER — APPOINTMENT (OUTPATIENT)
Dept: CARDIOLOGY | Facility: CLINIC | Age: 78
End: 2023-05-12
Payer: MEDICARE

## 2023-05-12 PROCEDURE — 93015 CV STRESS TEST SUPVJ I&R: CPT

## 2023-05-30 ENCOUNTER — NON-APPOINTMENT (OUTPATIENT)
Age: 78
End: 2023-05-30

## 2023-05-30 ENCOUNTER — APPOINTMENT (OUTPATIENT)
Dept: CARDIOLOGY | Facility: CLINIC | Age: 78
End: 2023-05-30
Payer: MEDICARE

## 2023-05-30 VITALS — SYSTOLIC BLOOD PRESSURE: 168 MMHG | OXYGEN SATURATION: 99 % | DIASTOLIC BLOOD PRESSURE: 75 MMHG | HEART RATE: 67 BPM

## 2023-05-30 PROCEDURE — 99214 OFFICE O/P EST MOD 30 MIN: CPT

## 2023-05-30 PROCEDURE — 93000 ELECTROCARDIOGRAM COMPLETE: CPT

## 2023-05-30 RX ORDER — BENZONATATE 100 MG/1
100 CAPSULE ORAL
Qty: 20 | Refills: 0 | Status: DISCONTINUED | COMMUNITY
Start: 2023-03-22 | End: 2023-05-30

## 2023-05-30 NOTE — HISTORY OF PRESENT ILLNESS
[FreeTextEntry1] : Drake Martin is an 78 year old man with recently diagnosed Multivessel CAD on CCTA presents for followup.  He can walk 3 miles and climb 6 flights of stairs without any sxs.  No chest pain or sob.  Follows a diet.

## 2023-05-30 NOTE — ASSESSMENT
[FreeTextEntry1] : Assessment:\par Drake Martin is an 78 year old man  a ccta and positive ctffr.  He had a stress test which showed no clear ischemia.  He has an excellent exercise tolerance with no sxs.  Would not do an angiogram at this time.

## 2023-05-30 NOTE — PHYSICAL EXAM
[Normal Conjunctiva] : normal conjunctiva [No Edema] : no edema [Normal] : moves all extremities, no focal deficits, normal speech [de-identified] : no acute distress [de-identified] : supple [de-identified] : JVP ~ 7 cm H20, RRR, s1, s2 [de-identified] : unlabored respirations, clear lung fields [de-identified] : non-distended

## 2023-05-30 NOTE — CARDIOLOGY SUMMARY
[de-identified] : ECG (3/24/23): normal sinus rhythm \par ECG (5/23/23): normal sinus rhythm  [de-identified] : Nuclear stress test (2008) at outside facility: Normal [de-identified] : TTE (2021) at outside facility: LVEF 65%. Mild MR. Mild TR \par \par TTE (4/2023): LVEF 55-60%, mild LVH. Normal RV size and systolic function. Mild MR. Mild TR. No aortic valve stenosis. Mild AR. No pericardial effusion. [de-identified] : CT Heart Calcium Score (3/2023): 1961 \par \par CT Coronary Angiogram (4/2023): Calcium score 1791. LM < 25% stenosis. LAD moderate luminal narrowing. Diagonals normal. LCx proximal moderate luminal narrowing. OM3 significant severe stenosis secondary to calcified and noncalcified plaque. RCA mild luminal narrowing. \par \par CT FFR (4/2023): High probability for lesion specific ischemia in OM3, PDA and distal LAD\par  [de-identified] : Carotid US (4/2023): Right proximal ICA 1-19% stenosis. Left proximal ICA 1-19% stenosis.

## 2023-06-09 ENCOUNTER — NON-APPOINTMENT (OUTPATIENT)
Age: 78
End: 2023-06-09

## 2023-06-09 ENCOUNTER — APPOINTMENT (OUTPATIENT)
Dept: CARDIOLOGY | Facility: CLINIC | Age: 78
End: 2023-06-09
Payer: MEDICARE

## 2023-06-09 ENCOUNTER — TRANSCRIPTION ENCOUNTER (OUTPATIENT)
Age: 78
End: 2023-06-09

## 2023-06-09 VITALS — HEART RATE: 66 BPM | SYSTOLIC BLOOD PRESSURE: 146 MMHG | OXYGEN SATURATION: 100 % | DIASTOLIC BLOOD PRESSURE: 75 MMHG

## 2023-06-09 PROCEDURE — 99214 OFFICE O/P EST MOD 30 MIN: CPT

## 2023-06-09 PROCEDURE — 93000 ELECTROCARDIOGRAM COMPLETE: CPT

## 2023-06-10 ENCOUNTER — RESULT CHARGE (OUTPATIENT)
Age: 78
End: 2023-06-10

## 2023-06-11 NOTE — PHYSICAL EXAM
[Normal Conjunctiva] : normal conjunctiva [No Edema] : no edema [Normal] : moves all extremities, no focal deficits, normal speech [de-identified] : no acute distress [de-identified] : supple [de-identified] : JVP ~ 7 cm H20, RRR, s1, s2 [de-identified] : unlabored respirations, clear lung fields [de-identified] : non-distended

## 2023-06-11 NOTE — HISTORY OF PRESENT ILLNESS
[FreeTextEntry1] : Drake Martin is an 78 year old man with past medical history of Major Depressive disorder (history of electroconvulsive therapy in 2003), Hypertension, Hyperlipidemia and history of Left leg DVT (s/p warfarin) with recently diagnosed Multivessel CAD presents for follow-up visit.\par \par Since his last visit the patient was evaluated by interventional cardiologist, Dr. Wilkinson and was not recommended to have coronary angiogram at this time due to lack of symptoms. He reports feeling well, denies exertional chest pain or shortness of breath. He has noticed that if he palpates on his left upper chest wall it can feel tender to palpation. Denies leg swelling or leg discomfort when walking. Reports home SBP of 130-140s.

## 2023-06-11 NOTE — ASSESSMENT
[FreeTextEntry1] : Assessment:\par Drake Martin is an 78 year old man with past medical history of Major Depressive disorder (history of electroconvulsive therapy in 2003), Hypertension, Hyperlipidemia and history of Left leg DVT (s/p warfarin) with recently diagnosed Multivessel CAD presents for follow-up visit.\par \par ECG today consistent with normal sinus rhythm, no ischemic ST changes. Home BP log of 130-140/70-80s with HR in 60s. Coronary CT angiogram (4/2023) consistent with significantly elevated calcium score 1791, left main < 25% stenosis and high probability for lesion specific ischemia in OM3, PDA and distal LAD. Echocardiogram (4/2023) consistent with normal LVEF 55-60%, mild LVH, normal RV size and systolic function, no significant valvular disease. Subsequent exercise treadmill stress test was negative for angina at a diagnostic peak level of stress, however ischemic ECG changes were present. The patient was evaluated by interventional cardiologist, Dr. Wilkinson and was not recommended to have coronary angiogram at this time due to lack of symptoms. He reports feeling well, denies exertional chest pain or shortness of breath. He has noticed tenderness to palpation of left chest wall which is likely musculoskeletal as it is not present on exertion. \par \par Recommendations:\par [] Multivessel CAD: No indication for coronary angiogram and revascularization per interventional cardiology due to lack of symptoms. Will continue guideline-directed maximal medical management; Aspirin 81 mg daily, Atorvastatin 40 mg daily, Carvedilol 3.125 mg BID (HR in 60s). Due to elevated BP, will start Losartan 25 mg daily. Explained to patient that he is at high risk for myocardial infarction with unrevascularized ischemic heart disease and should inform office if any new chest pain. Will repeat lipid panel (prior LDL 68 mg/dl).\par [] Hypertension: BP not at goal, will start Losartan 25 mg daily. Continue Carvedilol 3.125 mg BID. Patient to continue to monitor BP at home, goal BP < 130/80, recommended DASH/Mediterranean diet plan.\par [] Return to office: 3 months

## 2023-06-11 NOTE — CARDIOLOGY SUMMARY
[de-identified] : ECG (3/24/23): normal sinus rhythm \par ECG (5/4/23): normal sinus rhythm \par ECG (6/9/23): normal sinus rhythm with sinus arrhythmia  [de-identified] : Nuclear stress test (2008) at outside facility: Normal\par Exercise treadmill stress test (5/2023): Inferolateral horizontal ST depressions (108% MPHR). No symptoms. [de-identified] : TTE (2021) at outside facility: LVEF 65%. Mild MR. Mild TR \par \par TTE (4/2023): LVEF 55-60%, mild LVH. Normal RV size and systolic function. Mild MR. Mild TR. No aortic valve stenosis. Mild AR. No pericardial effusion. [de-identified] : CT Heart Calcium Score (3/2023): 1961 \par \par CT Coronary Angiogram (4/2023): Calcium score 1791. LM < 25% stenosis. LAD moderate luminal narrowing. Diagonals normal. LCx proximal moderate luminal narrowing. OM3 significant severe stenosis secondary to calcified and noncalcified plaque. RCA mild luminal narrowing. \par \par CT FFR (4/2023): High probability for lesion specific ischemia in OM3, PDA and distal LAD\par  [de-identified] : Carotid US (4/2023): Right proximal ICA 1-19% stenosis. Left proximal ICA 1-19% stenosis.

## 2023-06-12 ENCOUNTER — NON-APPOINTMENT (OUTPATIENT)
Age: 78
End: 2023-06-12

## 2023-08-09 ENCOUNTER — RX RENEWAL (OUTPATIENT)
Age: 78
End: 2023-08-09

## 2023-08-30 LAB
ALBUMIN SERPL ELPH-MCNC: 4.2 G/DL
ALP BLD-CCNC: 73 U/L
ALT SERPL-CCNC: 29 U/L
ANION GAP SERPL CALC-SCNC: 10 MMOL/L
AST SERPL-CCNC: 24 U/L
BILIRUB SERPL-MCNC: 0.4 MG/DL
BUN SERPL-MCNC: 30 MG/DL
CALCIUM SERPL-MCNC: 9 MG/DL
CHLORIDE SERPL-SCNC: 105 MMOL/L
CHOLEST SERPL-MCNC: 113 MG/DL
CO2 SERPL-SCNC: 28 MMOL/L
CREAT SERPL-MCNC: 1.12 MG/DL
EGFR: 66 ML/MIN/1.73M2
ESTIMATED AVERAGE GLUCOSE: 128 MG/DL
GLUCOSE SERPL-MCNC: 119 MG/DL
HBA1C MFR BLD HPLC: 6.1 %
HDLC SERPL-MCNC: 52 MG/DL
LDLC SERPL CALC-MCNC: 51 MG/DL
NONHDLC SERPL-MCNC: 61 MG/DL
POTASSIUM SERPL-SCNC: 4.8 MMOL/L
PROT SERPL-MCNC: 6.3 G/DL
SODIUM SERPL-SCNC: 143 MMOL/L
TRIGL SERPL-MCNC: 34 MG/DL

## 2023-09-01 ENCOUNTER — APPOINTMENT (OUTPATIENT)
Dept: GERIATRICS | Facility: CLINIC | Age: 78
End: 2023-09-01
Payer: MEDICARE

## 2023-09-01 VITALS
TEMPERATURE: 99.5 F | HEART RATE: 64 BPM | SYSTOLIC BLOOD PRESSURE: 130 MMHG | BODY MASS INDEX: 22.22 KG/M2 | OXYGEN SATURATION: 98 % | HEIGHT: 69 IN | RESPIRATION RATE: 18 BRPM | DIASTOLIC BLOOD PRESSURE: 65 MMHG | WEIGHT: 150 LBS

## 2023-09-01 PROCEDURE — 99214 OFFICE O/P EST MOD 30 MIN: CPT

## 2023-09-01 NOTE — PHYSICAL EXAM
[Sclera] : the sclera and conjunctiva were normal [EOMI] : extraocular movements were intact [Normal Appearance] : the appearance of the neck was normal [Edema] : edema was not present [Normal] : normal bowel sounds, non-tender [No Clubbing, Cyanosis] : no clubbing or cyanosis of the fingernails [Involuntary Movements] : no involuntary movements were seen [Normal Color / Pigmentation] : normal skin color and pigmentation [No Focal Deficits] : no focal deficits [Normal Affect] : the affect was normal [Normal Mood] : the mood was normal

## 2023-09-01 NOTE — HISTORY OF PRESENT ILLNESS
[FreeTextEntry1] : 77yo M with PMHx of HTN, HLD, PVD, hx of DVT, bipolar disorder, prediabetes, and bph seen for annual visit  11/24/21 2/17/22 Shingrix labwork reviewed with patient today  HTN: adherent with medications, denies chest pain or sob. walks stairs daily    prediabetes: 61 - adheres to limiting sugar  hx of varicose vein surgery  DVT in left leg in past  wears thigh high compression stockings since   bph: nocturia 2x, follows with urology DR. Nimesh Kerns PSA 6/2023 5.8  PSA12/2022 mid 5  PSA 6/2022 6.2  PSA 5/2021 4.83  PSA 12/2021 5.37  denies dysuria  hx of hernia repair (left)  hx of Dupuytren's contracture- bilateral.  right hand; s/p injection >15 years ago. denies pain or limitation in his daily activities. Saw Ortho, but opting for conservative management  scoliosis  bipolar d/o: controlled  hx of ECT (last 2004)  remains on fluvoxamine and had namenda added, follows with psych    goes for annual dermatology.  had dental implants, had one implant that fell out, but otherwise without dental complaints  hearing impaired wears hearing aide  Pt. is a retired from the [employer]. He lives with his spouse who is partially blind.  Pt. drives, able to complete all ADLs and IADLs.

## 2023-09-01 NOTE — ASSESSMENT
[FreeTextEntry1] : Predm2: dicussed a1c diet and exercise  no medications at this time  htn: controlled c/w current medications CAD: controlled c/w current medications  bipolar d/o: controlled c/w current medications f/u with psych  bph: PSA stable Dr. Nimesh Kerns

## 2023-09-01 NOTE — REVIEW OF SYSTEMS
[Eyesight Problems] : eyesight problems [Loss Of Hearing] : hearing loss [As noted in HPI] : as noted in HPI [As Noted in HPI] : as noted in HPI [Negative] : Heme/Lymph

## 2023-09-15 ENCOUNTER — NON-APPOINTMENT (OUTPATIENT)
Age: 78
End: 2023-09-15

## 2023-09-15 ENCOUNTER — APPOINTMENT (OUTPATIENT)
Dept: CARDIOLOGY | Facility: CLINIC | Age: 78
End: 2023-09-15
Payer: MEDICARE

## 2023-09-15 VITALS
OXYGEN SATURATION: 98 % | WEIGHT: 148 LBS | HEART RATE: 62 BPM | BODY MASS INDEX: 21.92 KG/M2 | RESPIRATION RATE: 18 BRPM | TEMPERATURE: 96.3 F | HEIGHT: 69 IN | SYSTOLIC BLOOD PRESSURE: 147 MMHG | DIASTOLIC BLOOD PRESSURE: 74 MMHG

## 2023-09-15 PROCEDURE — 99214 OFFICE O/P EST MOD 30 MIN: CPT

## 2023-09-15 PROCEDURE — 93000 ELECTROCARDIOGRAM COMPLETE: CPT

## 2023-10-10 NOTE — REASON FOR VISIT
[Symptom and Test Evaluation] : symptom and test evaluation [Coronary Artery Disease] : coronary artery disease Adequate: hears normal conversation without difficulty

## 2023-10-12 ENCOUNTER — RX RENEWAL (OUTPATIENT)
Age: 78
End: 2023-10-12

## 2023-11-22 ENCOUNTER — RX RENEWAL (OUTPATIENT)
Age: 78
End: 2023-11-22

## 2023-12-29 ENCOUNTER — RX RENEWAL (OUTPATIENT)
Age: 78
End: 2023-12-29

## 2024-01-05 RX ORDER — LOSARTAN POTASSIUM 25 MG/1
25 TABLET, FILM COATED ORAL
Qty: 90 | Refills: 0 | Status: ACTIVE | COMMUNITY
Start: 2023-06-09 | End: 1900-01-01

## 2024-01-26 ENCOUNTER — NON-APPOINTMENT (OUTPATIENT)
Age: 79
End: 2024-01-26

## 2024-01-26 ENCOUNTER — APPOINTMENT (OUTPATIENT)
Dept: CARDIOLOGY | Facility: CLINIC | Age: 79
End: 2024-01-26
Payer: MEDICARE

## 2024-01-26 VITALS
WEIGHT: 149 LBS | BODY MASS INDEX: 22.07 KG/M2 | HEART RATE: 76 BPM | HEIGHT: 69 IN | SYSTOLIC BLOOD PRESSURE: 126 MMHG | DIASTOLIC BLOOD PRESSURE: 71 MMHG

## 2024-01-26 PROCEDURE — 99213 OFFICE O/P EST LOW 20 MIN: CPT

## 2024-01-26 PROCEDURE — 93000 ELECTROCARDIOGRAM COMPLETE: CPT

## 2024-01-26 NOTE — PHYSICAL EXAM
[Normal Conjunctiva] : normal conjunctiva [No Edema] : no edema [Normal] : moves all extremities, no focal deficits, normal speech [de-identified] : well appearing [de-identified] : supple [de-identified] : JVP ~ 7 cm H20, RRR, s1, s2, no murmurs [de-identified] : unlabored respirations, clear lung fields [de-identified] : non-distended

## 2024-01-26 NOTE — REVIEW OF SYSTEMS
[Headache] : no headache [Blurry Vision] : no blurred vision [SOB] : no shortness of breath [Chest Discomfort] : no chest discomfort [Lower Ext Edema] : no extremity edema [Palpitations] : no palpitations [Abdominal Pain] : no abdominal pain [Cough] : no cough [Joint Pain] : no joint pain [Rash] : no rash [Dizziness] : no dizziness [Confusion] : no confusion was observed [Easy Bruising] : no tendency for easy bruising

## 2024-01-26 NOTE — RESULTS/DATA
[TextEntry] :   8/2023: CBC: WBC 4.4, Hgb 14, Hct 43, Plt 114 CMP: Na 143, K 4.8, BUN 30, Cr 1.1, GFR 66, AST 24, ALT 29  Lipid panel: Total cholesterol 113, LDL 51, HDL 52, Triglyceride 34 HbA1C: 6.1

## 2024-01-26 NOTE — HISTORY OF PRESENT ILLNESS
[FreeTextEntry1] : Drake Martin is an 78 year old man with past medical history of Major Depressive disorder (history of electroconvulsive therapy in 2003), Hypertension, Hyperlipidemia and history of Left leg DVT (s/p warfarin) with recently diagnosed Multivessel CAD presents for follow-up visit.  Since his last visit he reports feeling well. Reports that he is able to walk up 7 flights of stairs with no significant limitations. Denies exertional chest pain or shortness of breath. Reports that he wears compression stockings for mild lower extremity swelling.  walks uop 7 flights few extrea beats no cp on exertion no dizziness, no leg sweeling

## 2024-01-26 NOTE — ASSESSMENT
[FreeTextEntry1] : Assessment:  Drake Martin is an 78 year old man with past medical history of Major Depressive disorder (history of electroconvulsive therapy in 2003), Hypertension, Hyperlipidemia and history of Left leg DVT (s/p warfarin) with recently diagnosed Multivessel CAD presents for follow-up visit.  ECG today consistent with normal sinus rhythm, no ischemic ST changes. Home BP log of 130-140/70-80s with HR in 60s. Coronary CT angiogram (4/2023) consistent with significantly elevated calcium score 1791, left main < 25% stenosis and high probability for lesion specific ischemia in OM3, PDA and distal LAD. Echocardiogram (4/2023) consistent with normal LVEF 55-60%, mild LVH, normal RV size and systolic function, no significant valvular disease. Subsequent exercise treadmill stress test was negative for angina at a diagnostic peak level of stress, however ischemic ECG changes were present. The patient was evaluated by interventional cardiologist, Dr. Wilkinson and was not recommended to have coronary angiogram at this time due to lack of symptoms. He reports feeling well, denies exertional chest pain or shortness of breath. He has noticed tenderness to palpation of left chest wall which is likely musculoskeletal as it is not present on exertion.   Recommendations:  [] Multivessel CAD: No indication for coronary angiogram and revascularization per interventional cardiology due to lack of symptoms. Will continue guideline-directed maximal medical management; Aspirin 81 mg daily, Atorvastatin 40 mg daily, Carvedilol 3.125 mg BID (HR in 60s). Due to elevated BP, will start Losartan 25 mg daily. Explained to patient that he is at high risk for myocardial infarction with unrevascularized ischemic heart disease and should inform office if any new chest pain. Will repeat lipid panel (prior LDL 68 mg/dl).  [] Hypertension: BP not at goal, will start Losartan 25 mg daily. Continue Carvedilol 3.125 mg BID. Patient to continue to monitor BP at home, goal BP < 130/80, recommended DASH/Mediterranean diet plan.  [] Return to office: 3 months.

## 2024-01-26 NOTE — CARDIOLOGY SUMMARY
[de-identified] : ECG (3/24/23): normal sinus rhythm  ECG (5/4/23): normal sinus rhythm  ECG (6/9/23): normal sinus rhythm with sinus arrhythmia  ECG (9/15/23): sinus bradycardia  [de-identified] : Nuclear stress test (2008) at outside facility: Normal Exercise treadmill stress test (5/2023): Inferolateral horizontal ST depressions (108% MPHR). No symptoms. [de-identified] : TTE (2021) at outside facility: LVEF 65%. Mild MR. Mild TR   TTE (4/2023): LVEF 55-60%, mild LVH. Normal RV size and systolic function. Mild MR. Mild TR. No aortic valve stenosis. Mild AR. No pericardial effusion. [de-identified] : CT Heart Calcium Score (3/2023): 1961   CT Coronary Angiogram (4/2023): Calcium score 1791. LM < 25% stenosis. LAD moderate luminal narrowing. Diagonals normal. LCx proximal moderate luminal narrowing. OM3 significant severe stenosis secondary to calcified and noncalcified plaque. RCA mild luminal narrowing.   CT FFR (4/2023): High probability for lesion specific ischemia in OM3, PDA and distal LAD  [de-identified] : Carotid US (4/2023): Right proximal ICA 1-19% stenosis. Left proximal ICA 1-19% stenosis.

## 2024-03-04 ENCOUNTER — APPOINTMENT (OUTPATIENT)
Dept: GERIATRICS | Facility: CLINIC | Age: 79
End: 2024-03-04
Payer: MEDICARE

## 2024-03-04 VITALS
RESPIRATION RATE: 16 BRPM | BODY MASS INDEX: 21.33 KG/M2 | HEIGHT: 69 IN | SYSTOLIC BLOOD PRESSURE: 110 MMHG | WEIGHT: 144 LBS | TEMPERATURE: 97.3 F | OXYGEN SATURATION: 98 % | HEART RATE: 55 BPM | DIASTOLIC BLOOD PRESSURE: 62 MMHG

## 2024-03-04 DIAGNOSIS — D69.6 THROMBOCYTOPENIA, UNSPECIFIED: ICD-10-CM

## 2024-03-04 DIAGNOSIS — F31.60 BIPOLAR DISORDER, CURRENT EPISODE MIXED, UNSPECIFIED: ICD-10-CM

## 2024-03-04 DIAGNOSIS — R73.03 PREDIABETES.: ICD-10-CM

## 2024-03-04 DIAGNOSIS — N40.0 BENIGN PROSTATIC HYPERPLASIA WITHOUT LOWER URINARY TRACT SYMPMS: ICD-10-CM

## 2024-03-04 LAB
ALBUMIN SERPL ELPH-MCNC: 4.3 G/DL
ALP BLD-CCNC: 78 U/L
ALT SERPL-CCNC: 31 U/L
ANION GAP SERPL CALC-SCNC: 9 MMOL/L
AST SERPL-CCNC: 27 U/L
BILIRUB SERPL-MCNC: 0.5 MG/DL
BUN SERPL-MCNC: 34 MG/DL
CALCIUM SERPL-MCNC: 9 MG/DL
CHLORIDE SERPL-SCNC: 102 MMOL/L
CO2 SERPL-SCNC: 28 MMOL/L
CREAT SERPL-MCNC: 1.15 MG/DL
EGFR: 64 ML/MIN/1.73M2
ESTIMATED AVERAGE GLUCOSE: 117 MG/DL
GLUCOSE SERPL-MCNC: 122 MG/DL
HBA1C MFR BLD HPLC: 5.7 %
HCT VFR BLD CALC: 43.1 %
HGB BLD-MCNC: 14.6 G/DL
MCHC RBC-ENTMCNC: 33.3 PG
MCHC RBC-ENTMCNC: 33.9 GM/DL
MCV RBC AUTO: 98.4 FL
PLATELET # BLD AUTO: 134 K/UL
POTASSIUM SERPL-SCNC: 4 MMOL/L
PROT SERPL-MCNC: 6.5 G/DL
RBC # BLD: 4.38 M/UL
RBC # FLD: 12.4 %
SODIUM SERPL-SCNC: 140 MMOL/L
WBC # FLD AUTO: 6.11 K/UL

## 2024-03-04 PROCEDURE — 99214 OFFICE O/P EST MOD 30 MIN: CPT

## 2024-03-04 PROCEDURE — G2211 COMPLEX E/M VISIT ADD ON: CPT

## 2024-03-04 RX ORDER — MEMANTINE HYDROCHLORIDE 10 MG/1
10 TABLET, FILM COATED ORAL
Qty: 60 | Refills: 5 | Status: ACTIVE | COMMUNITY

## 2024-03-04 NOTE — HISTORY OF PRESENT ILLNESS
[FreeTextEntry1] : 78yo M with PMHx of HTN, HLD, PVD, hx of DVT, bipolar disorder, prediabetes, and bph seen for f/u visit  11/24/21 2/17/22 Shingrix labwork reviewed with patient today  HTN: adherent with medications, denies chest pain or sob. walks stairs daily  prediabetes: 5.7%  hx of varicose vein surgery  DVT in left leg in past  wears thigh high compression stockings since   bph: nocturia 2x, follows with urology DR. Nimesh Kerns PSA 6/2023 5.8  PSA12/2022 mid 5  PSA 6/2022 6.2  PSA 5/2021 4.83  PSA 12/2021 5.37  denies dysuria  hx of hernia repair (left)  hx of Dupuytren's contracture- bilateral.  right hand; s/p injection >15 years ago. denies pain or limitation in his daily activities. Saw Ortho, but opting for conservative management  scoliosis  bipolar d/o: controlled  hx of ECT (last 2004)  remains on fluvoxamine and namenda, follows with psych    goes for annual dermatology.  had dental implants  hearing impaired wears hearing aide  Pt. is a retired from the [employer]. He lives with his spouse who is partially blind.  Pt. drives, able to complete all ADLs and IADLs. [No falls in past year] : Patient reported no falls in the past year

## 2024-03-04 NOTE — PHYSICAL EXAM
[Sclera] : the sclera and conjunctiva were normal [EOMI] : extraocular movements were intact [Normal Appearance] : the appearance of the neck was normal [Edema] : edema was not present [Normal] : normal bowel sounds, non-tender [Involuntary Movements] : no involuntary movements were seen [No Clubbing, Cyanosis] : no clubbing or cyanosis of the fingernails [Normal Color / Pigmentation] : normal skin color and pigmentation [No Focal Deficits] : no focal deficits [Normal Affect] : the affect was normal [Normal Mood] : the mood was normal

## 2024-03-04 NOTE — REVIEW OF SYSTEMS
[Loss Of Hearing] : hearing loss [Eyesight Problems] : eyesight problems [As noted in HPI] : as noted in HPI [As Noted in HPI] : as noted in HPI [Negative] : Endocrine

## 2024-03-24 ENCOUNTER — RX RENEWAL (OUTPATIENT)
Age: 79
End: 2024-03-24

## 2024-03-24 RX ORDER — ATORVASTATIN CALCIUM 40 MG/1
40 TABLET, FILM COATED ORAL
Qty: 90 | Refills: 1 | Status: ACTIVE | COMMUNITY
Start: 2023-04-20 | End: 1900-01-01

## 2024-03-25 ENCOUNTER — RX RENEWAL (OUTPATIENT)
Age: 79
End: 2024-03-25

## 2024-03-25 RX ORDER — CARVEDILOL 3.12 MG/1
3.12 TABLET, FILM COATED ORAL
Qty: 180 | Refills: 3 | Status: ACTIVE | COMMUNITY
Start: 2020-03-26 | End: 1900-01-01

## 2024-06-20 ENCOUNTER — APPOINTMENT (OUTPATIENT)
Dept: CARDIOLOGY | Facility: CLINIC | Age: 79
End: 2024-06-20
Payer: COMMERCIAL

## 2024-06-20 VITALS
DIASTOLIC BLOOD PRESSURE: 72 MMHG | SYSTOLIC BLOOD PRESSURE: 132 MMHG | BODY MASS INDEX: 20.88 KG/M2 | HEIGHT: 69 IN | OXYGEN SATURATION: 98 % | HEART RATE: 65 BPM | WEIGHT: 141 LBS

## 2024-06-20 DIAGNOSIS — I10 ESSENTIAL (PRIMARY) HYPERTENSION: ICD-10-CM

## 2024-06-20 DIAGNOSIS — I25.10 ATHEROSCLEROTIC HEART DISEASE OF NATIVE CORONARY ARTERY W/OUT ANGINA PECTORIS: ICD-10-CM

## 2024-06-20 PROCEDURE — 93000 ELECTROCARDIOGRAM COMPLETE: CPT

## 2024-06-20 PROCEDURE — 99214 OFFICE O/P EST MOD 30 MIN: CPT | Mod: 25

## 2024-06-20 PROCEDURE — 99204 OFFICE O/P NEW MOD 45 MIN: CPT | Mod: 25

## 2024-06-21 ENCOUNTER — NON-APPOINTMENT (OUTPATIENT)
Age: 79
End: 2024-06-21

## 2024-06-21 PROBLEM — I25.10 CAD (CORONARY ARTERY DISEASE): Status: ACTIVE | Noted: 2023-04-20

## 2024-06-21 PROBLEM — I10 HTN (HYPERTENSION): Status: ACTIVE | Noted: 2020-01-30

## 2024-06-21 NOTE — HISTORY OF PRESENT ILLNESS
[FreeTextEntry1] : Drake Martin is an 79 year old man with past medical history of Major Depressive disorder (history of electroconvulsive therapy in 2003), Hypertension, Hyperlipidemia and history of Left leg DVT (s/p warfarin) with recently diagnosed Multivessel CAD (April 2023) presents for follow-up visit.  Since his last visit he reports feeling well. Reports that he is able to walk up 7 flights of stairs with no significant limitations. Denies exertional chest pain or shortness of breath.

## 2024-06-21 NOTE — PHYSICAL EXAM
[Normal Conjunctiva] : normal conjunctiva [No Edema] : no edema [Normal] : moves all extremities, no focal deficits, normal speech [de-identified] : well appearing [de-identified] : supple, no carotid bruits b/l [de-identified] : JVP ~ 7 cm H20, RRR, s1, s2, no murmurs [de-identified] : unlabored respirations, clear lung fields [de-identified] : non-distended

## 2024-06-21 NOTE — ASSESSMENT
[FreeTextEntry1] : Assessment: Drake Martin is an 79 year old man with past medical history of Major Depressive disorder (history of electroconvulsive therapy in 2003), Hypertension, Hyperlipidemia and history of Left leg DVT (s/p warfarin) with recently diagnosed Multivessel CAD (April 2023) presents for follow-up visit.  Since his last visit he reports feeling well. Reports that he is able to walk up 7 flights of stairs with no significant limitations. Denies exertional angina or dyspnea. ECG today consistent with normal sinus rhythm. Coronary CT angiogram (4/2023) consistent with significantly elevated calcium score 1791, left main <25% stenosis and high probability for lesion specific ischemia in OM3, PDA and distal LAD. Echocardiogram (4/2023) consistent with normal LVEF 55-60%, mild LVH, normal RV size and systolic function, no significant valvular disease. Subsequent exercise treadmill stress test was negative for angina at a diagnostic peak level of stress, however ischemic ECG changes were present. The patient was evaluated by interventional cardiologist, Dr. Wilkinson and was not recommended to have coronary angiogram at this time due to lack of symptoms.   Recommendations: [] Multivessel CAD: Asymptomatic with physical exertion. No indication for coronary angiogram and revascularization per interventional cardiology due to lack of symptoms. Will continue guideline-directed maximal medical management; Aspirin 81 mg daily, Atorvastatin 40 mg daily, Carvedilol 3.125 mg BID. Patient has been made aware that he is at high risk for myocardial infarction with unrevascularized ischemic heart disease and should inform office if any new chest pain. LDL 51 in 8/2023 is well controlled. [] Hypertension: BP well controlled, continue Losartan 25 mg daily and Carvedilol 3.125 mg BID. Patient to continue to monitor BP at home, goal BP < 130/80, recommended DASH/Mediterranean diet plan. [] Return to office: 6 months or sooner if needed.

## 2024-08-24 ENCOUNTER — RX RENEWAL (OUTPATIENT)
Age: 79
End: 2024-08-24

## 2024-08-27 DIAGNOSIS — E56.9 VITAMIN DEFICIENCY, UNSPECIFIED: ICD-10-CM

## 2024-09-06 LAB
25(OH)D3 SERPL-MCNC: 42.5 NG/ML
ALBUMIN SERPL ELPH-MCNC: 4.3 G/DL
ALP BLD-CCNC: 86 U/L
ALT SERPL-CCNC: 24 U/L
ANION GAP SERPL CALC-SCNC: 12 MMOL/L
AST SERPL-CCNC: 23 U/L
BILIRUB SERPL-MCNC: 0.4 MG/DL
BUN SERPL-MCNC: 35 MG/DL
CALCIUM SERPL-MCNC: 9.3 MG/DL
CHLORIDE SERPL-SCNC: 102 MMOL/L
CHOLEST SERPL-MCNC: 130 MG/DL
CO2 SERPL-SCNC: 26 MMOL/L
CREAT SERPL-MCNC: 1.07 MG/DL
EGFR: 70 ML/MIN/1.73M2
ESTIMATED AVERAGE GLUCOSE: 128 MG/DL
FOLATE SERPL-MCNC: >20 NG/ML
GLUCOSE SERPL-MCNC: 110 MG/DL
HBA1C MFR BLD HPLC: 6.1 %
HCT VFR BLD CALC: 45.8 %
HDLC SERPL-MCNC: 55 MG/DL
HGB BLD-MCNC: 14.7 G/DL
LDLC SERPL CALC-MCNC: 63 MG/DL
MCHC RBC-ENTMCNC: 32.1 GM/DL
MCHC RBC-ENTMCNC: 33 PG
MCV RBC AUTO: 102.7 FL
NONHDLC SERPL-MCNC: 75 MG/DL
PLATELET # BLD AUTO: 133 K/UL
POTASSIUM SERPL-SCNC: 5 MMOL/L
PROT SERPL-MCNC: 6.6 G/DL
RBC # BLD: 4.46 M/UL
RBC # FLD: 12.8 %
SODIUM SERPL-SCNC: 140 MMOL/L
TRIGL SERPL-MCNC: 56 MG/DL
VIT B12 SERPL-MCNC: 411 PG/ML
WBC # FLD AUTO: 5.36 K/UL

## 2024-09-09 ENCOUNTER — APPOINTMENT (OUTPATIENT)
Dept: GERIATRICS | Facility: CLINIC | Age: 79
End: 2024-09-09
Payer: MEDICARE

## 2024-09-12 ENCOUNTER — APPOINTMENT (OUTPATIENT)
Dept: GERIATRICS | Facility: CLINIC | Age: 79
End: 2024-09-12
Payer: MEDICARE

## 2024-09-12 VITALS
DIASTOLIC BLOOD PRESSURE: 70 MMHG | OXYGEN SATURATION: 98 % | SYSTOLIC BLOOD PRESSURE: 108 MMHG | TEMPERATURE: 97.2 F | HEIGHT: 69 IN | HEART RATE: 77 BPM | WEIGHT: 147 LBS | RESPIRATION RATE: 18 BRPM | BODY MASS INDEX: 21.77 KG/M2

## 2024-09-12 DIAGNOSIS — D69.6 THROMBOCYTOPENIA, UNSPECIFIED: ICD-10-CM

## 2024-09-12 DIAGNOSIS — R73.03 PREDIABETES.: ICD-10-CM

## 2024-09-12 DIAGNOSIS — I10 ESSENTIAL (PRIMARY) HYPERTENSION: ICD-10-CM

## 2024-09-12 DIAGNOSIS — N40.0 BENIGN PROSTATIC HYPERPLASIA WITHOUT LOWER URINARY TRACT SYMPMS: ICD-10-CM

## 2024-09-12 DIAGNOSIS — F31.60 BIPOLAR DISORDER, CURRENT EPISODE MIXED, UNSPECIFIED: ICD-10-CM

## 2024-09-12 DIAGNOSIS — I25.10 ATHEROSCLEROTIC HEART DISEASE OF NATIVE CORONARY ARTERY W/OUT ANGINA PECTORIS: ICD-10-CM

## 2024-09-12 PROCEDURE — 99214 OFFICE O/P EST MOD 30 MIN: CPT

## 2024-09-12 PROCEDURE — G2211 COMPLEX E/M VISIT ADD ON: CPT

## 2024-09-12 NOTE — PHYSICAL EXAM
[Sclera] : the sclera and conjunctiva were normal [EOMI] : extraocular movements were intact [Normal Appearance] : the appearance of the neck was normal [Normal] : heart rate was normal and rhythm regular, normal S1 and S2 [Edema] : edema was not present [No Clubbing, Cyanosis] : no clubbing or cyanosis of the fingernails [Involuntary Movements] : no involuntary movements were seen [Normal Color / Pigmentation] : normal skin color and pigmentation [No Focal Deficits] : no focal deficits [Normal Affect] : the affect was normal [Normal Mood] : the mood was normal [Cervical Lymph Nodes Enlarged Posterior Bilaterally] : posterior cervical [Supraclavicular Lymph Nodes Enlarged Bilaterally] : supraclavicular [Cervical Lymph Nodes Enlarged Anterior Bilaterally] : anterior cervical, supraclavicular

## 2024-09-12 NOTE — ASSESSMENT
[FreeTextEntry1] : Predm2: dicussed a1c diet and exercise  no medications at this time  htn: controlled c/w current medications CAD: controlled c/w current medications  bipolar d/o: controlled c/w current medications f/u with psych  thrombocytopenia: stable  bph: PSA stable Dr. Nimesh Kerns

## 2024-09-12 NOTE — HISTORY OF PRESENT ILLNESS
[No falls in past year] : Patient reported no falls in the past year [FreeTextEntry1] : 80yo M with PMHx of HTN, HLD, PVD, hx of DVT, bipolar disorder, prediabetes, and bph seen for f/u visit  11/24/21 2/17/22 Shingrix labwork reviewed with patient today  HTN: adherent with medications, denies chest pain or sob. walks stairs daily without problems  prediabetes: 6.1%  hx of varicose vein surgery  DVT in left leg in past  wears thigh high compression stockings since   bph: nocturia 2x, follows with urology DR. Nimesh Kerns PSA 6/2023 5.8  PSA12/2022 mid 5  PSA 6/2022 6.2  PSA 5/2021 4.83  PSA 12/2021 5.37  denies dysuria  hx of hernia repair (left)  hx of Dupuytren's contracture- bilateral.  right hand; s/p injection >15 years ago. denies pain or limitation in his daily activities. Saw Ortho, but opting for conservative management  scoliosis  bipolar d/o: controlled  hx of ECT (last 2004)  remains on fluvoxamine and namenda, follows with psych  goes for annual dermatology.  had dental implants  hearing impaired wears hearing aide  Pt. is a retired from the [employer]. He lives with his spouse who is partially blind.  Pt. drives, able to complete all ADLs and IADLs.

## 2024-10-08 ENCOUNTER — TRANSCRIPTION ENCOUNTER (OUTPATIENT)
Age: 79
End: 2024-10-08

## 2024-10-22 ENCOUNTER — APPOINTMENT (OUTPATIENT)
Dept: GERIATRICS | Facility: CLINIC | Age: 79
End: 2024-10-22
Payer: MEDICARE

## 2024-10-22 DIAGNOSIS — K59.00 CONSTIPATION, UNSPECIFIED: ICD-10-CM

## 2024-10-22 PROCEDURE — 99212 OFFICE O/P EST SF 10 MIN: CPT

## 2024-11-21 ENCOUNTER — RX RENEWAL (OUTPATIENT)
Age: 79
End: 2024-11-21

## 2024-12-04 ENCOUNTER — NON-APPOINTMENT (OUTPATIENT)
Age: 79
End: 2024-12-04

## 2024-12-04 ENCOUNTER — APPOINTMENT (OUTPATIENT)
Dept: CARDIOLOGY | Facility: CLINIC | Age: 79
End: 2024-12-04
Payer: MEDICARE

## 2024-12-04 VITALS
HEIGHT: 69 IN | SYSTOLIC BLOOD PRESSURE: 142 MMHG | OXYGEN SATURATION: 99 % | BODY MASS INDEX: 21.48 KG/M2 | WEIGHT: 145 LBS | HEART RATE: 58 BPM | DIASTOLIC BLOOD PRESSURE: 75 MMHG

## 2024-12-04 DIAGNOSIS — I25.10 ATHEROSCLEROTIC HEART DISEASE OF NATIVE CORONARY ARTERY W/OUT ANGINA PECTORIS: ICD-10-CM

## 2024-12-04 DIAGNOSIS — I10 ESSENTIAL (PRIMARY) HYPERTENSION: ICD-10-CM

## 2024-12-04 PROCEDURE — 93000 ELECTROCARDIOGRAM COMPLETE: CPT

## 2024-12-04 PROCEDURE — 99214 OFFICE O/P EST MOD 30 MIN: CPT

## 2024-12-06 ENCOUNTER — RX RENEWAL (OUTPATIENT)
Age: 79
End: 2024-12-06

## 2025-02-20 ENCOUNTER — RX RENEWAL (OUTPATIENT)
Age: 80
End: 2025-02-20

## 2025-03-11 LAB
ALBUMIN SERPL ELPH-MCNC: 4.1 G/DL
ALP BLD-CCNC: 85 U/L
ALT SERPL-CCNC: 43 U/L
ANION GAP SERPL CALC-SCNC: 11 MMOL/L
AST SERPL-CCNC: 35 U/L
BILIRUB SERPL-MCNC: 0.7 MG/DL
BUN SERPL-MCNC: 31 MG/DL
CALCIUM SERPL-MCNC: 9.5 MG/DL
CHLORIDE SERPL-SCNC: 102 MMOL/L
CO2 SERPL-SCNC: 30 MMOL/L
CREAT SERPL-MCNC: 1.19 MG/DL
EGFRCR SERPLBLD CKD-EPI 2021: 61 ML/MIN/1.73M2
GLUCOSE SERPL-MCNC: 108 MG/DL
HCT VFR BLD CALC: 44.9 %
HGB BLD-MCNC: 14.4 G/DL
MCHC RBC-ENTMCNC: 32.1 G/DL
MCHC RBC-ENTMCNC: 32.2 PG
MCV RBC AUTO: 100.4 FL
PLATELET # BLD AUTO: 151 K/UL
POTASSIUM SERPL-SCNC: 5.3 MMOL/L
PROT SERPL-MCNC: 6.5 G/DL
RBC # BLD: 4.47 M/UL
RBC # FLD: 12.3 %
SODIUM SERPL-SCNC: 143 MMOL/L
WBC # FLD AUTO: 5.93 K/UL

## 2025-03-12 ENCOUNTER — APPOINTMENT (OUTPATIENT)
Age: 80
End: 2025-03-12
Payer: MEDICARE

## 2025-03-12 VITALS
HEIGHT: 69 IN | WEIGHT: 135 LBS | DIASTOLIC BLOOD PRESSURE: 71 MMHG | RESPIRATION RATE: 16 BRPM | HEART RATE: 64 BPM | OXYGEN SATURATION: 98 % | BODY MASS INDEX: 19.99 KG/M2 | TEMPERATURE: 97.9 F | SYSTOLIC BLOOD PRESSURE: 107 MMHG

## 2025-03-12 DIAGNOSIS — I10 ESSENTIAL (PRIMARY) HYPERTENSION: ICD-10-CM

## 2025-03-12 DIAGNOSIS — F31.60 BIPOLAR DISORDER, CURRENT EPISODE MIXED, UNSPECIFIED: ICD-10-CM

## 2025-03-12 DIAGNOSIS — I77.9 DISORDER OF ARTERIES AND ARTERIOLES, UNSPECIFIED: ICD-10-CM

## 2025-03-12 DIAGNOSIS — R63.4 ABNORMAL WEIGHT LOSS: ICD-10-CM

## 2025-03-12 DIAGNOSIS — I25.10 ATHEROSCLEROTIC HEART DISEASE OF NATIVE CORONARY ARTERY W/OUT ANGINA PECTORIS: ICD-10-CM

## 2025-03-12 DIAGNOSIS — N40.0 BENIGN PROSTATIC HYPERPLASIA WITHOUT LOWER URINARY TRACT SYMPMS: ICD-10-CM

## 2025-03-12 PROCEDURE — G2211 COMPLEX E/M VISIT ADD ON: CPT

## 2025-03-12 PROCEDURE — 99214 OFFICE O/P EST MOD 30 MIN: CPT

## 2025-04-22 ENCOUNTER — RX RENEWAL (OUTPATIENT)
Age: 80
End: 2025-04-22

## 2025-05-03 LAB
CHOLEST SERPL-MCNC: 131 MG/DL
HDLC SERPL-MCNC: 61 MG/DL
LDLC SERPL-MCNC: 60 MG/DL
NONHDLC SERPL-MCNC: 70 MG/DL
TRIGL SERPL-MCNC: 43 MG/DL

## 2025-05-07 ENCOUNTER — APPOINTMENT (OUTPATIENT)
Dept: CARDIOLOGY | Facility: CLINIC | Age: 80
End: 2025-05-07
Payer: MEDICARE

## 2025-05-07 ENCOUNTER — NON-APPOINTMENT (OUTPATIENT)
Age: 80
End: 2025-05-07

## 2025-05-07 VITALS
BODY MASS INDEX: 21.03 KG/M2 | DIASTOLIC BLOOD PRESSURE: 69 MMHG | RESPIRATION RATE: 16 BRPM | HEART RATE: 65 BPM | WEIGHT: 142 LBS | HEIGHT: 69 IN | SYSTOLIC BLOOD PRESSURE: 132 MMHG | OXYGEN SATURATION: 99 %

## 2025-05-07 DIAGNOSIS — I10 ESSENTIAL (PRIMARY) HYPERTENSION: ICD-10-CM

## 2025-05-07 DIAGNOSIS — I25.10 ATHEROSCLEROTIC HEART DISEASE OF NATIVE CORONARY ARTERY W/OUT ANGINA PECTORIS: ICD-10-CM

## 2025-05-07 DIAGNOSIS — R42 DIZZINESS AND GIDDINESS: ICD-10-CM

## 2025-05-07 PROCEDURE — 93242 EXT ECG>48HR<7D RECORDING: CPT

## 2025-05-07 PROCEDURE — 99214 OFFICE O/P EST MOD 30 MIN: CPT

## 2025-05-07 PROCEDURE — 93000 ELECTROCARDIOGRAM COMPLETE: CPT | Mod: 59

## 2025-05-21 ENCOUNTER — NON-APPOINTMENT (OUTPATIENT)
Age: 80
End: 2025-05-21

## 2025-06-04 ENCOUNTER — APPOINTMENT (OUTPATIENT)
Dept: CARDIOLOGY | Facility: CLINIC | Age: 80
End: 2025-06-04
Payer: MEDICARE

## 2025-06-04 PROCEDURE — 93306 TTE W/DOPPLER COMPLETE: CPT

## 2025-06-06 ENCOUNTER — RX RENEWAL (OUTPATIENT)
Age: 80
End: 2025-06-06

## 2025-07-08 ENCOUNTER — APPOINTMENT (OUTPATIENT)
Dept: CARDIOLOGY | Facility: CLINIC | Age: 80
End: 2025-07-08
Payer: MEDICARE

## 2025-07-08 PROCEDURE — 93880 EXTRACRANIAL BILAT STUDY: CPT

## 2025-08-01 ENCOUNTER — APPOINTMENT (OUTPATIENT)
Dept: CARDIOLOGY | Facility: CLINIC | Age: 80
End: 2025-08-01
Payer: MEDICARE

## 2025-08-01 VITALS
BODY MASS INDEX: 20.59 KG/M2 | DIASTOLIC BLOOD PRESSURE: 58 MMHG | HEIGHT: 69 IN | RESPIRATION RATE: 19 BRPM | WEIGHT: 139 LBS | OXYGEN SATURATION: 98 % | HEART RATE: 73 BPM | SYSTOLIC BLOOD PRESSURE: 102 MMHG

## 2025-08-01 DIAGNOSIS — I10 ESSENTIAL (PRIMARY) HYPERTENSION: ICD-10-CM

## 2025-08-01 DIAGNOSIS — I25.10 ATHEROSCLEROTIC HEART DISEASE OF NATIVE CORONARY ARTERY W/OUT ANGINA PECTORIS: ICD-10-CM

## 2025-08-01 DIAGNOSIS — R42 DIZZINESS AND GIDDINESS: ICD-10-CM

## 2025-08-01 PROCEDURE — 93000 ELECTROCARDIOGRAM COMPLETE: CPT

## 2025-08-01 PROCEDURE — 99214 OFFICE O/P EST MOD 30 MIN: CPT

## 2025-08-01 PROCEDURE — G2211 COMPLEX E/M VISIT ADD ON: CPT

## 2025-08-14 ENCOUNTER — RX RENEWAL (OUTPATIENT)
Age: 80
End: 2025-08-14